# Patient Record
Sex: FEMALE | Race: WHITE | Employment: FULL TIME | URBAN - METROPOLITAN AREA
[De-identification: names, ages, dates, MRNs, and addresses within clinical notes are randomized per-mention and may not be internally consistent; named-entity substitution may affect disease eponyms.]

---

## 2017-10-09 PROBLEM — F33.0 MILD EPISODE OF RECURRENT MAJOR DEPRESSIVE DISORDER (HCC): Status: ACTIVE | Noted: 2017-10-09

## 2017-10-09 PROBLEM — F33.0 MILD EPISODE OF RECURRENT MAJOR DEPRESSIVE DISORDER: Status: ACTIVE | Noted: 2017-10-09

## 2017-10-20 ENCOUNTER — HOSPITAL ENCOUNTER (OUTPATIENT)
Dept: ULTRASOUND IMAGING | Age: 42
Discharge: HOME | End: 2017-10-20
Attending: PHYSICIAN ASSISTANT
Payer: COMMERCIAL

## 2017-10-20 DIAGNOSIS — E03.9 ACQUIRED HYPOTHYROIDISM: ICD-10-CM

## 2017-10-20 DIAGNOSIS — F33.0 MILD EPISODE OF RECURRENT MAJOR DEPRESSIVE DISORDER (HCC): ICD-10-CM

## 2017-10-20 DIAGNOSIS — F41.9 ANXIETY: ICD-10-CM

## 2017-10-20 PROCEDURE — 76536 US EXAM OF HEAD AND NECK: CPT | Performed by: PHYSICIAN ASSISTANT

## 2017-11-20 PROBLEM — E55.9 VITAMIN D DEFICIENCY: Status: ACTIVE | Noted: 2017-11-20

## 2019-04-30 PROCEDURE — 88175 CYTOPATH C/V AUTO FLUID REDO: CPT | Performed by: PHYSICIAN ASSISTANT

## 2019-04-30 PROCEDURE — 87147 CULTURE TYPE IMMUNOLOGIC: CPT | Performed by: PHYSICIAN ASSISTANT

## 2019-04-30 PROCEDURE — 87086 URINE CULTURE/COLONY COUNT: CPT | Performed by: PHYSICIAN ASSISTANT

## 2019-04-30 PROCEDURE — 87624 HPV HI-RISK TYP POOLED RSLT: CPT | Performed by: PHYSICIAN ASSISTANT

## 2020-11-03 PROBLEM — Z86.19 HISTORY OF HPV INFECTION: Status: ACTIVE | Noted: 2020-11-03

## 2020-12-20 ENCOUNTER — IMMUNIZATION (OUTPATIENT)
Dept: LAB | Facility: HOSPITAL | Age: 45
End: 2020-12-20
Attending: PREVENTIVE MEDICINE
Payer: COMMERCIAL

## 2020-12-20 DIAGNOSIS — Z23 NEED FOR VACCINATION: ICD-10-CM

## 2020-12-20 PROCEDURE — 0001A PFIZER-BIONTECH COVID-19 VACCINE: CPT

## 2021-01-10 ENCOUNTER — IMMUNIZATION (OUTPATIENT)
Dept: LAB | Facility: HOSPITAL | Age: 46
End: 2021-01-10
Attending: PREVENTIVE MEDICINE

## 2021-01-10 DIAGNOSIS — Z23 NEED FOR VACCINATION: ICD-10-CM

## 2021-01-10 PROCEDURE — 0002A SARSCOV2 VAC 30MCG/0.3ML IM: CPT

## 2023-04-04 PROBLEM — D68.51 FACTOR V LEIDEN (HCC): Status: ACTIVE | Noted: 2023-04-04

## 2023-05-07 ENCOUNTER — HOSPITAL ENCOUNTER (OUTPATIENT)
Age: 48
Discharge: HOME | End: 2023-05-07
Payer: COMMERCIAL

## 2023-05-07 VITALS
HEIGHT: 65 IN | RESPIRATION RATE: 18 BRPM | SYSTOLIC BLOOD PRESSURE: 127 MMHG | HEART RATE: 61 BPM | DIASTOLIC BLOOD PRESSURE: 78 MMHG | BODY MASS INDEX: 19.83 KG/M2 | OXYGEN SATURATION: 100 % | TEMPERATURE: 98 F | WEIGHT: 119 LBS

## 2023-05-07 DIAGNOSIS — R21 SKIN RASH: Primary | ICD-10-CM

## 2023-05-07 DIAGNOSIS — B97.89 SORE THROAT (VIRAL): ICD-10-CM

## 2023-05-07 DIAGNOSIS — J02.8 SORE THROAT (VIRAL): ICD-10-CM

## 2023-05-07 LAB — S PYO AG THROAT QL IA.RAPID: NEGATIVE

## 2023-05-07 PROCEDURE — 99215 OFFICE O/P EST HI 40 MIN: CPT

## 2023-05-07 PROCEDURE — S0028 INJECTION, FAMOTIDINE, 20 MG: HCPCS

## 2023-05-07 PROCEDURE — 99214 OFFICE O/P EST MOD 30 MIN: CPT

## 2023-05-07 PROCEDURE — 96372 THER/PROPH/DIAG INJ SC/IM: CPT

## 2023-05-07 PROCEDURE — 96375 TX/PRO/DX INJ NEW DRUG ADDON: CPT

## 2023-05-07 PROCEDURE — 96374 THER/PROPH/DIAG INJ IV PUSH: CPT

## 2023-05-07 PROCEDURE — 87651 STREP A DNA AMP PROBE: CPT | Performed by: PHYSICIAN ASSISTANT

## 2023-05-07 RX ORDER — METHYLPREDNISOLONE SODIUM SUCCINATE 125 MG/2ML
125 INJECTION, POWDER, LYOPHILIZED, FOR SOLUTION INTRAMUSCULAR; INTRAVENOUS ONCE
Status: COMPLETED | OUTPATIENT
Start: 2023-05-07 | End: 2023-05-07

## 2023-05-07 RX ORDER — FAMOTIDINE 10 MG/ML
20 INJECTION, SOLUTION INTRAVENOUS ONCE
Status: COMPLETED | OUTPATIENT
Start: 2023-05-07 | End: 2023-05-07

## 2023-05-07 RX ORDER — PREDNISONE 20 MG/1
40 TABLET ORAL DAILY
Qty: 10 TABLET | Refills: 0 | Status: SHIPPED | OUTPATIENT
Start: 2023-05-07 | End: 2023-05-12

## 2023-05-07 RX ORDER — DIPHENHYDRAMINE HYDROCHLORIDE 50 MG/ML
25 INJECTION INTRAMUSCULAR; INTRAVENOUS ONCE
Status: COMPLETED | OUTPATIENT
Start: 2023-05-07 | End: 2023-05-07

## 2023-05-07 NOTE — ED INITIAL ASSESSMENT (HPI)
Patient reports fever last week for 4 days, around 101 range. Reports painful/tender/swollen lymph nodes to neck. States she developed rash to right arm yesterday and not spreading to trunk and legs as well as face. States started klonopin on 4/18. Intermittent headaches. Not eating or sleeping well. Denies cough or congestion.

## 2023-05-07 NOTE — DISCHARGE INSTRUCTIONS
Please return to the Emergency department/clinic if symptoms worsen or you develop new symptoms. Follow up with your primary care physician in 2 days. Take any medications prescribed to you as instructed. While suffering from an acute allergic reaction and/or hives, you should take an antihistamine every 8 hours. If you wish to avoid the drowsiness associated with Benadryl, you may use a long-acting, nondrowsy antihistamine such as Claritin, Zyrtec or Allegra. In an acute allergic reaction, you may take these medications more than once every 24 hours as described on the label. Ideally, you would take an antihistamine in the morning with a 20mg dose of Pepcid, another dose of antihistamine 8 hours later and at nighttime take a dose of Benadryl with a second 20mg dose of Pepcid. This can all be done while also using steroids to control allergic reation/hives.

## 2024-02-14 ENCOUNTER — OFFICE VISIT (OUTPATIENT)
Dept: INTEGRATIVE MEDICINE | Facility: CLINIC | Age: 49
End: 2024-02-14
Payer: COMMERCIAL

## 2024-02-14 VITALS
SYSTOLIC BLOOD PRESSURE: 126 MMHG | DIASTOLIC BLOOD PRESSURE: 80 MMHG | BODY MASS INDEX: 19.83 KG/M2 | HEIGHT: 65 IN | WEIGHT: 119 LBS | OXYGEN SATURATION: 98 % | HEART RATE: 64 BPM

## 2024-02-14 DIAGNOSIS — E34.8 ENDOCRINE AXIS DYSFUNCTION: ICD-10-CM

## 2024-02-14 DIAGNOSIS — F41.9 ANXIETY DISORDER, UNSPECIFIED TYPE: ICD-10-CM

## 2024-02-14 DIAGNOSIS — E03.9 ACQUIRED HYPOTHYROIDISM: Primary | ICD-10-CM

## 2024-02-14 DIAGNOSIS — K90.49 FOOD INTOLERANCE: ICD-10-CM

## 2024-02-14 DIAGNOSIS — R14.0 ABDOMINAL BLOATING: ICD-10-CM

## 2024-02-14 DIAGNOSIS — E61.7 DEFICIENCY OF MULTIPLE NUTRIENT ELEMENTS: ICD-10-CM

## 2024-02-14 DIAGNOSIS — K58.2 IRRITABLE BOWEL SYNDROME WITH BOTH CONSTIPATION AND DIARRHEA: ICD-10-CM

## 2024-02-14 DIAGNOSIS — R53.83 OTHER FATIGUE: ICD-10-CM

## 2024-02-14 DIAGNOSIS — G47.09 OTHER INSOMNIA: ICD-10-CM

## 2024-02-14 DIAGNOSIS — R63.5 WEIGHT GAIN: ICD-10-CM

## 2024-02-14 DIAGNOSIS — E55.9 VITAMIN D DEFICIENCY: ICD-10-CM

## 2024-02-14 NOTE — PATIENT INSTRUCTIONS
Smart phone apps to begin a meditative practice:    Headspace - Limited content for free    Insight Meditation Timer- (Free)-Great all-around conchita to use for guided meditations of many different types and lengths or just to use as a tool to time and track your meditation practice. This is my absolute favorite!    Calm- (Free)    Walking Meditations-($1.99)- Get your walk AND meditation done together. A good way to start out for individuals who feel they \"just can't sit still\" to begin a meditative practice.    Abide (free limited content)- If you prefer a more spiritually/Latter-day-based meditation practice      2.  FLORES by [employer]atur[employer]      FLORES (gamma-aminobutyric acid), an amino acid and a key inhibitory neurotransmitter, may help support a calm mind as well as muscle relaxation. Neurotransmitters are chemical messengers that carry information between nerve cells or from nerve cells to other target cells. FLORES is synthesized in the body from glutamic acid where its function is to have an inhibitory effect on the firing of neurons.  Open capsule and expel 1/2 the contents onto tongue and let absorb at night. May increase to full capsule as needed.      ----------OR------------      Insomnitol by [employer] for Health -  Insomnitol™ is a blend of botanicals, nutrients, and neurotransmitter precursors designed to support quality, restful sleep. By providing nutritional support for calm brain activity, Insomnitol™ helps promote the body’s natural ability to fall asleep and stay asleep.* Key ingredients include botanicals that support nervous system function, PharmaGABA® (a proprietary form of gamma-aminobutyric acid [FLORES]), L-theanine, melatonin, 5-hydroxytryptophan (5-HTP), and pyridoxal-5-phosphate (the activated form of vitamin B6).    Dose: 2 capsules at bedtime.         Where to Purchase: [email or web address]  This is our [facility] online dispensary for vitamins and  supplements where you receive a 10% discount off of supplement prices! Select the \"Log In\" and then use your email address to complete creating your personal account. Please call [employer] at [phone], if you need direct help.     The products and items listed below (the “Products”)  and their claims have not been evaluated by the Food and Drug Administration. Dietary products are not intended to treat, prevent, mitigate or cure disease. Ultimately, you must draw your own conclusion as to the efficacy of the Product and immediately stop use of the Products if a negative reaction should arise.  You should always consult a licensed health care professional before starting any supplement, dietary, or exercise program, especially if you are pregnant or have any pre-existing injuries or medical conditions. The patient agrees that the [facility] and its affiliates and its [facility] (“[facility]”) are not liable for the patients use of the Products. [facility] makes no representations or warranties of any kind, expressed or implied, as to the Products, including, but not limited to its efficacy, benefits or outcomes.  Patient agrees to contact his/her healthcare professional and stop use of Products should any reactions arise    ---------------------------------------------------------------------------------------------    3. Keep a food and symptom journal for identifying foods that may be triggering your GI symptoms.

## 2024-02-14 NOTE — PROGRESS NOTES
Janee Mcknight is a 48 year old female.  Chief Complaint   Patient presents with    [facility]     Hormone, santo,agitated, fatigued       HPI:   48 new pt presents today for initial evaluation.     Has been experiencing anxiety (towards end of day - hard to turn brain off), insomnia, brain fog, energy fades in afternoon..   On Wellbutrin and in therapy twice weekly.   H/o depression and anxiety, but worse in the last year after significant stressors. Able to do daily routines but not motivated to go out much.     Hypothyroid - Has been on Levothyroxine over 20yrs. PCP has been managing.       Menses mostly regular, perhaps shortening cycles, every 3-4weeks approx  - flow is heavy at times. More emotional lately.     Insomnia - Will wake up more frequently and with night terrors.  works night shift x20yrs    Weight loss resistance - Started berberine x2-3wks to help with blood sugar  IBS-C>D, bloating - will be after certain meals but unsure of triggers   BM consistency changes a lot, but not typically pellets    Factor V Leiden    REVIEW OF SYSTEMS:   Review of Systems   Constitutional:  Positive for fatigue. Negative for chills and fever.   Eyes: Negative.  Negative for visual disturbance.   Respiratory: Negative.  Negative for cough, shortness of breath and wheezing.    Cardiovascular: Negative.  Negative for chest pain.   Gastrointestinal:  Positive for abdominal distention, constipation and diarrhea. Negative for nausea and vomiting.   Endocrine: Positive for cold intolerance.   Genitourinary: Negative.    Musculoskeletal: Negative.    Skin: Negative.    Allergic/Immunologic: Negative.    Neurological: Negative.  Negative for weakness and headaches.   Hematological: Negative.    Psychiatric/Behavioral:  Positive for sleep disturbance. The patient is nervous/anxious.             FAMILY HISTORY:      Family History   Problem Relation Age of Onset    Hypertension Father     High Cholesterol Father     Breast  Cancer Mother 64    Heart Attack Paternal Grandfather     Suicide History Maternal Cousin Male        MEDICAL HISTORY:     Past Medical History:   Diagnosis Date    Allergic rhinitis     Anxiety state, unspecified 08/29/2012    Dehydration     Depressive disorder, not elsewhere classified 08/29/2012    Diarrhea     Dizziness and giddiness     Eating disorder, unspecified 08/29/2012    Factor 5 Leiden mutation, heterozygous (HCC)     Gastroenteritis     Headache(784.0)     Heartburn     Hematuria     w abdominal pain    Hydronephrosis     mild right and hydrooureter    Hypothyroidism     Hypothyroidism     Lipid screening 09/27/2007    Other unknown and unspecified cause of morbidity or mortality     Panic disorder     Paresthesia     Strep pharyngitis     acute    Substance abuse (HCC)     Suicide attempt ([facility])     overdose    Tooth infection     wisdom    Unspecified adjustment reaction     Urgency of urination     Vertigo     chronic    Viral syndrome     Vomiting        CURRENT MEDICATIONS:     Current Outpatient Medications   Medication Sig Dispense Refill    buPROPion 75 MG Oral Tab Take 1 tablet (75 mg total) by mouth daily. 90 tablet 0    MAGNESIUM OR Take by mouth.      Multiple Vitamin (MULTIVITAMIN ADULT OR) Take by mouth.      PREBIOTIC PRODUCT OR Take by mouth.      LEVOTHYROXINE 75 MCG Oral Tab TAKE 1 TABLET(75 MCG) BY MOUTH EVERY MORNING BEFORE BREAKFAST 90 tablet 0    vilazodone (VIIBRYD) 10 MG Oral Tab Take 1 tablet (10 mg total) by mouth every morning. (Patient not taking: Reported on 2/14/2024) 30 tablet 1    hydrOXYzine 25 MG Oral Tab Take 1 tablet (25 mg total) by mouth nightly as needed for Anxiety. (Patient not taking: Reported on 2/14/2024) 30 tablet 1    clonazePAM (KLONOPIN) 0.5 MG Oral Tab Take 1 tablet (0.5 mg total) by mouth 2 (two) times daily as needed for Anxiety. (Patient not taking: Reported on 2/14/2024) 60 tablet 1    Probiotic Product (PROBIOTIC OR) Take by mouth. (Patient not  taking: Reported on 2/14/2024)         SOCIAL HISTORY:   Lifestyle Factors affecting health:    Diet - Trying to Increase fiber to 25-30g and protein, avoids dairy but has cheese and yogurt. Avoiding gluten when she can.     Exercise - [place]-bke or pilates, or walks daily 5mi    Stress - Very high      -> IR sauna blanket, and working out   Sleep - hard to shut brain down to fall asleep, wakes through night    Pediatric nurse at [employer]  Social History     Socioeconomic History    Marital status:    Tobacco Use    Smoking status: Never    Smokeless tobacco: Never   Vaping Use    Vaping Use: Never used   Substance and Sexual Activity    Alcohol use: Yes     Comment: social, has not had since the end of March 2023    Drug use: No   Other Topics Concern    Caffeine Concern Yes    Exercise Yes    Seat Belt Yes       SURGICAL HISTORY:     Past Surgical History:   Procedure Laterality Date    Colposcopy,loop electrd cervix excis N/A 1/25/2016    Procedure: LOOP ELECTRICAL EXCISION CERVIX,COLPOSCOPY;  Surgeon: David Rodriguez MD;  Location: [facility]    Hysteroscopy,with sampling N/A 10/21/2015    Procedure: HYSTEROSCOPY W/DILATION AND CURETTAGE;  Surgeon: David Rodriguez MD;  Location: [facility]       PHYSICAL EXAM:     Vitals:    02/14/24 1331   BP: 126/80   BP Location: Right arm   Patient Position: Sitting   Cuff Size: adult   Pulse: 64   SpO2: 98%   Weight: 119 lb (54 kg)   Height: 5' 5\" (1.651 m)       Physical Exam  Vitals reviewed.   Constitutional:       General: She is not in acute distress.     Appearance: Normal appearance.   HENT:      Mouth/Throat:      Mouth: Mucous membranes are moist.      Pharynx: Oropharynx is clear.   Eyes:      Extraocular Movements: Extraocular movements intact.      Conjunctiva/sclera: Conjunctivae normal.   Neck:      Thyroid: No thyromegaly.   Cardiovascular:      Rate and Rhythm: Normal rate and regular rhythm.      Heart sounds: Normal heart  sounds.   Pulmonary:      Effort: Pulmonary effort is normal.      Breath sounds: Normal breath sounds.   Abdominal:      Palpations: Abdomen is soft.   Musculoskeletal:         General: No swelling or deformity.   Skin:     General: Skin is warm and dry.   Neurological:      General: No focal deficit present.      Mental Status: She is alert and oriented to person, place, and time.   Psychiatric:         Mood and Affect: Mood normal.         Behavior: Behavior normal.         Thought Content: Thought content normal.         Judgment: Judgment normal.          ASSESSMENT AND PLAN:     No visits with results within 6 Month(s) from this visit.   Latest known visit with results is:   Admission on 05/07/2023, Discharged on 05/07/2023   Component Date Value Ref Range Status    Strep A by PCR 05/07/2023 Negative  Negative Final      No results found.    1. Other fatigue    2. Other insomnia    3. Anxiety disorder, unspecified type  - Acupuncture Therapy [facility] ([place]) - Internal Referral    4. Vitamin D deficiency  - Vitamin D; Future    5. Deficiency of multiple nutrient elements  - Vitamin B12    6. Abdominal bloating  - HLA DQ2/DQ8 (Celiac Disease Genotyping)  - CELIAC DISEASE SCREEN Reflex [E]; Future    7. Food intolerance  - HLA DQ2/DQ8 (Celiac Disease Genotyping)  - CELIAC DISEASE SCREEN Reflex [E]; Future    8. Acquired hypothyroidism  - Assay, Thyroid Stim Hormone  - Free T3 (Triiodothryronine)  - Free T4, (Free Thyroxine)  - Reverse T3, Serum  - Thyroid peroxidase & thyroglobulin ab    9. Weight gain  - Hemoglobin A1C  - Insulin    10. Endocrine axis dysfunction  - Pregnenolone by MS/MS, Serum; Future  - Dehydroepiandrosterone Sulfate  - Estradiol  - Progesterone  - Testosterone,Total and Weakly Bound w/ SHBG  - Assay, Thyroid Stim Hormone  - Free T3 (Triiodothryronine)  - Free T4, (Free Thyroxine)  - Reverse T3, Serum  - Thyroid peroxidase & thyroglobulin ab  - Hemoglobin A1C  - Insulin  - HLA  DQ2/DQ8 (Celiac Disease Genotyping)  - Comp Metabolic Panel (14)  - Folic Acid Serum (Folate)  - Acupuncture Therapy [facility] ([place]) - Internal Referral    11. Irritable bowel syndrome with both constipation and diarrhea  - Acupuncture Therapy [facility] ([place]) - Internal Referral      Time spent with patient: Over 50 minutes spent in chart review and in direct communication with patient obtaining and reviewing history, creating a unique care plan, explaining the rationale for treatment, reviewing potential SE and overall treatment plan,  documenting all clinical information in Epic. Over 50% of this time was in education, counseling and coordination of care.     Problem List Items Addressed This Visit          Endocrine and Metabolic    Acquired hypothyroidism - Primary    Relevant Orders    Assay, Thyroid Stim Hormone    Free T3 (Triiodothryronine)    Free T4, (Free Thyroxine)    Reverse T3, Serum    Thyroid peroxidase & thyroglobulin ab    Vitamin D deficiency    Relevant Orders    Vitamin D       Mental Health    Anxiety disorder    Relevant Orders    Acupuncture Therapy [facility] ([place]) - Internal Referral     Other Visit Diagnoses       Other fatigue        Other insomnia        Deficiency of multiple nutrient elements        Relevant Orders    Vitamin B12    Abdominal bloating        Relevant Orders    HLA DQ2/DQ8 (Celiac Disease Genotyping)    CELIAC DISEASE SCREEN Reflex [E]    Food intolerance        Relevant Orders    HLA DQ2/DQ8 (Celiac Disease Genotyping)    CELIAC DISEASE SCREEN Reflex [E]    Weight gain        Relevant Orders    Hemoglobin A1C    Insulin    Endocrine axis dysfunction        Relevant Orders    Pregnenolone by MS/MS, Serum    Dehydroepiandrosterone Sulfate    Estradiol    Progesterone    Testosterone,Total and Weakly Bound w/ SHBG    Assay, Thyroid Stim Hormone    Free T3 (Triiodothryronine)    Free T4, (Free Thyroxine)    Reverse T3, Serum     Thyroid peroxidase & thyroglobulin ab    Hemoglobin A1C    Insulin    HLA DQ2/DQ8 (Celiac Disease Genotyping)    Comp Metabolic Panel (14)    Folic Acid Serum (Folate)    Acupuncture Therapy [facility] ([place]) - Internal Referral    Irritable bowel syndrome with both constipation and diarrhea        Relevant Orders    Acupuncture Therapy [facility] ([place]) - Internal Referral           Endocrine:  Hypothyroidism -PCP has been managing and last labs at [facility] in April. She is experiencing some underactive symptoms, but complicated by significant stressors in last year that could be effecting primarily the adrenal function and sex hormones as well.   -> check hormone, cortisol, and thyroid hormone levels    GI:  IBS - C>D with abdominal bloating. Discussed keeping a food and symptom journal as well as trial of removing gluten and dairy.   -> Handout for fiber sources  Consider KB[facility] FIT testing if not improved with the above.    HPA Axis: Increased stress, anxiety and depression. Working with a therapist and on meds. Rec meditation, Acupuncture, deep breathing in the AM, journaling.  -> Acupuncture Rx     Insomnia - Rec trial of FLORES at bedtime. Complicated by shift work.     Given further recommendations as below    Orders Placed This Visit:  Orders Placed This Encounter   Procedures    Pregnenolone by MS/MS, Serum    Dehydroepiandrosterone Sulfate    Estradiol    Progesterone    Testosterone,Total and Weakly Bound w/ SHBG    Assay, Thyroid Stim Hormone    Free T3 (Triiodothryronine)    Free T4, (Free Thyroxine)    Reverse T3, Serum    Thyroid peroxidase & thyroglobulin ab    Vitamin D    Vitamin B12    Hemoglobin A1C    Insulin    HLA DQ2/DQ8 (Celiac Disease Genotyping)    Comp Metabolic Panel (14)    Folic Acid Serum (Folate)    CELIAC DISEASE SCREEN Reflex [E]     Orders Placed This Encounter   Procedures    Acupuncture Therapy [facility] ([place]) - Internal Referral        Patient Instructions   Smart phone apps to begin a meditative practice:    Headspace - Limited content for free    Insight Meditation Timer- (Free)-Great all-around conchita to use for guided meditations of many different types and lengths or just to use as a tool to time and track your meditation practice. This is my absolute favorite!    Calm- (Free)    Walking Meditations-($1.99)- Get your walk AND meditation done together. A good way to start out for individuals who feel they \"just can't sit still\" to begin a meditative practice.    Abide (free limited content)- If you prefer a more spiritually/Yarsani-based meditation practice      2.  FLORES by [employer]      FLORES (gamma-aminobutyric acid), an amino acid and a key inhibitory neurotransmitter, may help support a calm mind as well as muscle relaxation. Neurotransmitters are chemical messengers that carry information between nerve cells or from nerve cells to other target cells. FLORES is synthesized in the body from glutamic acid where its function is to have an inhibitory effect on the firing of neurons.  Open capsule and expel 1/2 the contents onto tongue and let absorb at night. May increase to full capsule as needed.      ----------OR------------      Insomnitol by [employer] for [employer] -  Insomnitol™ is a blend of botanicals, nutrients, and neurotransmitter precursors designed to support quality, restful sleep. By providing nutritional support for calm brain activity, Insomnitol™ helps promote the body’s natural ability to fall asleep and stay asleep.* Key ingredients include botanicals that support nervous system function, PharmaGABA® (a proprietary form of gamma-aminobutyric acid [FLORES]), L-theanine, melatonin, 5-hydroxytryptophan (5-HTP), and pyridoxal-5-phosphate (the activated form of vitamin B6).    Dose: 2 capsules at bedtime.         Where to Purchase: [email or web address]  This is our [facility] online  dispensary for vitamins and supplements where you receive a 10% discount off of supplement prices! Select the \"Log In\" and then use your email address to complete creating your personal account. Please call [employer] at [phone], if you need direct help.     The products and items listed below (the “Products”)  and their claims have not been evaluated by the Food and Drug Administration. Dietary products are not intended to treat, prevent, mitigate or cure disease. Ultimately, you must draw your own conclusion as to the efficacy of the Product and immediately stop use of the Products if a negative reaction should arise.  You should always consult a licensed health care professional before starting any supplement, dietary, or exercise program, especially if you are pregnant or have any pre-existing injuries or medical conditions. The patient agrees that the [facility] and its affiliates and its [facility] (“[facility]”) are not liable for the patients use of the Products. [facility] makes no representations or warranties of any kind, expressed or implied, as to the Products, including, but not limited to its efficacy, benefits or outcomes.  Patient agrees to contact his/her healthcare professional and stop use of Products should any reactions arise    ---------------------------------------------------------------------------------------------    3. Keep a food and symptom journal for identifying foods that may be triggering your GI symptoms.    Return in about 8 weeks (around 4/10/2024), or 60min appt, for anxiety, IBS, hormones, lab f/u.    Patient affirmed understanding of plan and all questions were answered.     Bernarda Rivera PA-C

## 2024-02-15 ENCOUNTER — PATIENT MESSAGE (OUTPATIENT)
Dept: INTEGRATIVE MEDICINE | Facility: CLINIC | Age: 49
End: 2024-02-15

## 2024-02-15 DIAGNOSIS — E03.9 ACQUIRED HYPOTHYROIDISM: ICD-10-CM

## 2024-02-26 RX ORDER — LEVOTHYROXINE SODIUM 0.07 MG/1
75 TABLET ORAL
Qty: 90 TABLET | Refills: 0 | Status: SHIPPED | OUTPATIENT
Start: 2024-02-26

## 2024-02-27 ENCOUNTER — LAB ENCOUNTER (OUTPATIENT)
Dept: LAB | Facility: HOSPITAL | Age: 49
End: 2024-02-27
Attending: PHYSICIAN ASSISTANT
Payer: COMMERCIAL

## 2024-02-27 DIAGNOSIS — K90.49 FOOD INTOLERANCE: ICD-10-CM

## 2024-02-27 DIAGNOSIS — E55.9 VITAMIN D DEFICIENCY: ICD-10-CM

## 2024-02-27 DIAGNOSIS — R14.0 ABDOMINAL BLOATING: ICD-10-CM

## 2024-02-27 DIAGNOSIS — E34.8 ENDOCRINE AXIS DYSFUNCTION: ICD-10-CM

## 2024-02-27 LAB
ALBUMIN SERPL-MCNC: 3.9 G/DL (ref 3.4–5)
ALBUMIN/GLOB SERPL: 1.1 {RATIO} (ref 1–2)
ALP LIVER SERPL-CCNC: 57 U/L
ALT SERPL-CCNC: 15 U/L
ANION GAP SERPL CALC-SCNC: 2 MMOL/L (ref 0–18)
AST SERPL-CCNC: 23 U/L (ref 15–37)
BILIRUB SERPL-MCNC: 0.4 MG/DL (ref 0.1–2)
BUN BLD-MCNC: 18 MG/DL (ref 9–23)
CALCIUM BLD-MCNC: 9 MG/DL (ref 8.5–10.1)
CHLORIDE SERPL-SCNC: 109 MMOL/L (ref 98–112)
CO2 SERPL-SCNC: 26 MMOL/L (ref 21–32)
CREAT BLD-MCNC: 0.93 MG/DL
DHEA-S SERPL-MCNC: 91.7 UG/DL
EGFRCR SERPLBLD CKD-EPI 2021: 76 ML/MIN/1.73M2 (ref 60–?)
EST. AVERAGE GLUCOSE BLD GHB EST-MCNC: 117 MG/DL (ref 68–126)
ESTRADIOL SERPL-MCNC: 203.9 PG/ML
FASTING STATUS PATIENT QL REPORTED: YES
FOLATE SERPL-MCNC: 22.3 NG/ML (ref 8.7–?)
GLOBULIN PLAS-MCNC: 3.6 G/DL (ref 2.8–4.4)
GLUCOSE BLD-MCNC: 94 MG/DL (ref 70–99)
HBA1C MFR BLD: 5.7 % (ref ?–5.7)
IGA SERPL-MCNC: 262 MG/DL (ref 70–312)
INSULIN SERPL-ACNC: 4.2 MU/L (ref 3–25)
OSMOLALITY SERPL CALC.SUM OF ELEC: 286 MOSM/KG (ref 275–295)
POTASSIUM SERPL-SCNC: 3.5 MMOL/L (ref 3.5–5.1)
PROGEST SERPL-MCNC: 9.26 NG/ML
PROT SERPL-MCNC: 7.5 G/DL (ref 6.4–8.2)
SODIUM SERPL-SCNC: 137 MMOL/L (ref 136–145)
T3FREE SERPL-MCNC: 2.25 PG/ML (ref 2.4–4.2)
T4 FREE SERPL-MCNC: 1 NG/DL (ref 0.8–1.7)
THYROGLOB SERPL-MCNC: <15 U/ML (ref ?–60)
THYROPEROXIDASE AB SERPL-ACNC: 109 U/ML (ref ?–60)
TSI SER-ACNC: 3.47 MIU/ML (ref 0.36–3.74)
VIT B12 SERPL-MCNC: 483 PG/ML (ref 193–986)
VIT D+METAB SERPL-MCNC: 34.2 NG/ML (ref 30–100)

## 2024-02-27 PROCEDURE — 80053 COMPREHEN METABOLIC PANEL: CPT | Performed by: PHYSICIAN ASSISTANT

## 2024-02-27 PROCEDURE — 86800 THYROGLOBULIN ANTIBODY: CPT | Performed by: PHYSICIAN ASSISTANT

## 2024-02-27 PROCEDURE — 84443 ASSAY THYROID STIM HORMONE: CPT | Performed by: PHYSICIAN ASSISTANT

## 2024-02-27 PROCEDURE — 82784 ASSAY IGA/IGD/IGG/IGM EACH: CPT

## 2024-02-27 PROCEDURE — 84481 FREE ASSAY (FT-3): CPT | Performed by: PHYSICIAN ASSISTANT

## 2024-02-27 PROCEDURE — 84439 ASSAY OF FREE THYROXINE: CPT | Performed by: PHYSICIAN ASSISTANT

## 2024-02-27 PROCEDURE — 82306 VITAMIN D 25 HYDROXY: CPT

## 2024-02-27 PROCEDURE — 86376 MICROSOMAL ANTIBODY EACH: CPT | Performed by: PHYSICIAN ASSISTANT

## 2024-02-27 PROCEDURE — 36415 COLL VENOUS BLD VENIPUNCTURE: CPT | Performed by: PHYSICIAN ASSISTANT

## 2024-02-27 PROCEDURE — 81377 HLA II TYPE 1 AG EQUIV LR: CPT | Performed by: PHYSICIAN ASSISTANT

## 2024-02-27 PROCEDURE — 82670 ASSAY OF TOTAL ESTRADIOL: CPT | Performed by: PHYSICIAN ASSISTANT

## 2024-02-27 PROCEDURE — 82746 ASSAY OF FOLIC ACID SERUM: CPT | Performed by: PHYSICIAN ASSISTANT

## 2024-02-27 PROCEDURE — 83525 ASSAY OF INSULIN: CPT | Performed by: PHYSICIAN ASSISTANT

## 2024-02-27 PROCEDURE — 84140 ASSAY OF PREGNENOLONE: CPT

## 2024-02-27 PROCEDURE — 86364 TISS TRNSGLTMNASE EA IG CLAS: CPT

## 2024-02-27 PROCEDURE — 84482 T3 REVERSE: CPT | Performed by: PHYSICIAN ASSISTANT

## 2024-02-27 PROCEDURE — 82627 DEHYDROEPIANDROSTERONE: CPT | Performed by: PHYSICIAN ASSISTANT

## 2024-02-27 PROCEDURE — 82607 VITAMIN B-12: CPT | Performed by: PHYSICIAN ASSISTANT

## 2024-02-27 PROCEDURE — 84144 ASSAY OF PROGESTERONE: CPT | Performed by: PHYSICIAN ASSISTANT

## 2024-02-27 PROCEDURE — 83036 HEMOGLOBIN GLYCOSYLATED A1C: CPT | Performed by: PHYSICIAN ASSISTANT

## 2024-02-27 PROCEDURE — 84410 TESTOSTERONE BIOAVAILABLE: CPT | Performed by: PHYSICIAN ASSISTANT

## 2024-02-29 LAB — TTG IGA SER-ACNC: 0.4 U/ML (ref ?–7)

## 2024-03-01 LAB — REVERSE T3: 12.5 NG/DL

## 2024-03-03 LAB — PREGNENOLONE: 159 NG/DL

## 2024-03-05 LAB
DQ2 (DQA1 0501/0505,DQB1 02XX): NEGATIVE
DQ8 (DQA1 03XX, DQB1 0302): NEGATIVE

## 2024-03-06 LAB
SEX HORM BIND GLOB: 176 NMOL/L
TESTOST % FREE+WEAK BND: 5.7 %
TESTOST FREE+WEAK BND: 1.7 NG/DL
TESTOSTERONE TOT /MS: 30 NG/DL

## 2024-04-02 ENCOUNTER — TELEPHONE (OUTPATIENT)
Dept: INTEGRATIVE MEDICINE | Facility: CLINIC | Age: 49
End: 2024-04-02

## 2024-04-03 ENCOUNTER — PATIENT MESSAGE (OUTPATIENT)
Dept: INTEGRATIVE MEDICINE | Facility: CLINIC | Age: 49
End: 2024-04-03

## 2024-04-03 ENCOUNTER — TELEMEDICINE (OUTPATIENT)
Dept: INTEGRATIVE MEDICINE | Facility: CLINIC | Age: 49
End: 2024-04-03
Payer: COMMERCIAL

## 2024-04-03 DIAGNOSIS — R14.0 ABDOMINAL BLOATING: ICD-10-CM

## 2024-04-03 DIAGNOSIS — G47.09 OTHER INSOMNIA: ICD-10-CM

## 2024-04-03 DIAGNOSIS — E34.8 ENDOCRINE AXIS DYSFUNCTION: ICD-10-CM

## 2024-04-03 DIAGNOSIS — K58.2 IRRITABLE BOWEL SYNDROME WITH BOTH CONSTIPATION AND DIARRHEA: ICD-10-CM

## 2024-04-03 DIAGNOSIS — E06.3 HYPOTHYROIDISM DUE TO HASHIMOTO'S THYROIDITIS: Primary | ICD-10-CM

## 2024-04-03 DIAGNOSIS — E55.9 VITAMIN D DEFICIENCY: ICD-10-CM

## 2024-04-03 DIAGNOSIS — E03.8 HYPOTHYROIDISM DUE TO HASHIMOTO'S THYROIDITIS: Primary | ICD-10-CM

## 2024-04-03 DIAGNOSIS — R53.83 OTHER FATIGUE: ICD-10-CM

## 2024-04-03 DIAGNOSIS — F41.9 ANXIETY DISORDER, UNSPECIFIED TYPE: ICD-10-CM

## 2024-04-03 RX ORDER — BUPROPION HYDROCHLORIDE 75 MG/1
75 TABLET ORAL 2 TIMES DAILY
COMMUNITY

## 2024-04-03 RX ORDER — THYROID 30 MG/1
30 TABLET ORAL DAILY
Qty: 30 TABLET | Refills: 2 | Status: SHIPPED | OUTPATIENT
Start: 2024-04-03

## 2024-04-03 RX ORDER — THYROID 15 MG/1
15 TABLET ORAL DAILY
Qty: 30 TABLET | Refills: 2 | Status: SHIPPED | OUTPATIENT
Start: 2024-04-03

## 2024-04-03 NOTE — PATIENT INSTRUCTIONS
Recommendations and summary:    Catherine Varela, The Hashimoto’s Protocol is a compilation of action steps, and a roadmap that will help you navigate your healing journey.   [email or web address]       ----------------------------------------------------------------------------------------------    2. Berberine 500 mg twice daily    3. Inositol (Powder) by [employer] for healthy blood sugar regulation and anxiety:    Inositol is a component of the B-complex family. Evans-inositol is the primary form of inositol found in the central nervous system. It plays an important role in cell membrane formation and serves as part of the phosphatidylinositol secondary messenger system, supporting serotonin, norepinenephrine and cholinergic receptor function. As a result, inositol may support healthy mood, emotional wellness and behavior, and help lessen occasional nervous tension. Research also suggests that evans-inositol may help to support healthy ovulatory activity, ovarian function, and reproductive system function. Also, Evans-inositol inhibits duodenal glucose absorption and reduces blood glucoses rises.      Take 2 scoops in water, 1-2 times daily, with or between meals.  2 scoops = 4g     4.  Switch from levothyroxine to NP thyroid.   Let us know after 2 weeks if you are not seeing an improvement in symptoms and we can increase from there.  -> Get labs drawn in 4 weeks after starting medication.  Hold the thyroid medication the morning of the blood draw and take the medication after.    Absorption of thyroid hormone medication is affected by:  -Soy  -Coffee  -Possibly grapefruit juice  -Calcium Carbonate (in bone supplements and antacids such as Pepcid, TUMS) -Aluminum Antacids (i.e. Mylanta, Gaviscon)  -Ferrous Sulfate  Therefore, please take medication away from the above foods/meds.    *Exercise increases endorphins, improves mood, reduces anxiety, and improves sleep - all of which also improves  thyroid function and tissue absorption of hormones.     5. [employer] FIT testing for identifying food sensitivities.   2 options will be placed in [facility] and then let me know which you would prefer.     The  and 176 Tests are patented, multi-pathway delayed food sensitivity tests. The test uses patented technology that measures both IgG and Immune Complexes, the most common food-related pathways in the body. This enables the FIT test to be able to identify food sensitivities, inflammation and leaky gut from a single test.    Food Sensitivities affect more than 100 million people worldwide. They are very difficult to identify because the symptoms can be delayed up to 72 hours after eating.    Go Here to see the foods that are tested with each one: [email or web address]    Expect an e-mail from [employer] with next steps on how to complete the test. Should you have any questions regarding the test and/or how to complete it, please reach out to [employer] through their online portal.     [email or web address]    To review the results of your specialty test make sure to schedule an appointment Bernarda [facility].       ------------------------------------------------------------------------------------    Where to Purchase above supplements: [email or web address]  This is our [facility] online dispensary for vitamins and supplements where you receive a 10% discount off of supplement prices! Select the \"Log In\" and then use your email address to complete creating your personal account. Please call [employer] at [phone], if you need direct help.     The products and items listed below (the “Products”)  and their claims have not been evaluated by the Food and Drug Administration. Dietary products are not intended to treat, prevent, mitigate or cure disease. Ultimately, you must draw your own conclusion as to the efficacy  of the Product and immediately stop use of the Products if a negative reaction should arise.  You should always consult a licensed health care professional before starting any supplement, dietary, or exercise program, especially if you are pregnant or have any pre-existing injuries or medical conditions. The patient agrees that the [facility] and its affiliates and its [facility] (“[facility]”) are not liable for the patients use of the Products. [facility] makes no representations or warranties of any kind, expressed or implied, as to the Products, including, but not limited to its efficacy, benefits or outcomes.  Patient agrees to contact his/her healthcare professional and stop use of Products should any reactions arise

## 2024-04-03 NOTE — PROGRESS NOTES
Janee Mcknight is a 49 year old female.  Chief Complaint   Patient presents with    Follow - Up     Lab results       HPI:   48 new pt presents today for initial evaluation.     Started the Vit D and FLORES at night x3-4weeks.  Started a wellness journal.     Anxiety (towards end of day - hard to turn brain off), insomnia, brain fog, energy fades in afternoon.   On Wellbutrin and in therapy twice weekly.   H/o depression and anxiety, but worse in the last year after significant stressors. Able to do daily routines but not motivated to go out much.     Hypothyroid - Has been on Levothyroxine over 20yrs. PCP has been managing.       Menses mostly regular, perhaps shortening cycles, every 3-4weeks approx  - flow is heavy at times and 1 day of significant cramping. More emotional lately.   Has \"Better hormones\" drink daily that may have chasteberry, ashwagandha, and inositol (250mg) x1yr    Insomnia - Will wake up more frequently and with night terrors.  works night shift x20yrs.    Weight loss resistance - Started berberine x2-3wks to help with blood sugar but has not been taking regularly.    IBS-C>D, bloating - will be after certain meals but unsure of triggers   BM consistency changes a lot, but not typically pellets    H/o Factor V Leiden    REVIEW OF SYSTEMS:   Review of Systems   Constitutional:  Positive for fatigue. Negative for chills and fever.   Eyes: Negative.  Negative for visual disturbance.   Respiratory: Negative.  Negative for cough, shortness of breath and wheezing.    Cardiovascular: Negative.  Negative for chest pain.   Gastrointestinal:  Positive for abdominal distention, constipation and diarrhea. Negative for nausea and vomiting.   Endocrine: Positive for cold intolerance.   Genitourinary: Negative.    Musculoskeletal: Negative.    Skin: Negative.    Allergic/Immunologic: Negative.    Neurological: Negative.  Negative for weakness and headaches.   Hematological: Negative.    Psychiatric/Behavioral:   Positive for sleep disturbance. The patient is nervous/anxious.             FAMILY HISTORY:      Family History   Problem Relation Age of Onset    Hypertension Father     High Cholesterol Father     Breast Cancer Mother 64    Heart Attack Paternal Grandfather     Suicide History Maternal Cousin Male        MEDICAL HISTORY:     Past Medical History:   Diagnosis Date    Allergic rhinitis     Anxiety state, unspecified 08/29/2012    Dehydration     Depressive disorder, not elsewhere classified 08/29/2012    Diarrhea     Dizziness and giddiness     Eating disorder, unspecified 08/29/2012    Factor 5 Leiden mutation, heterozygous (HCC)     Gastroenteritis     Headache(784.0)     Heartburn     Hematuria     w abdominal pain    Hydronephrosis     mild right and hydrooureter    Hypothyroidism     Hypothyroidism     Lipid screening 09/27/2007    Other unknown and unspecified cause of morbidity or mortality     Panic disorder     Paresthesia     Strep pharyngitis     acute    Substance abuse (HCC)     Suicide attempt (HCC)     overdose    Tooth infection     wisdom    Unspecified adjustment reaction     Urgency of urination     Vertigo     chronic    Viral syndrome     Vomiting        CURRENT MEDICATIONS:     Current Outpatient Medications   Medication Sig Dispense Refill    buPROPion 75 MG Oral Tab Take 1 tablet (75 mg total) by mouth 2 (two) times daily.      thyroid (NP THYROID) 30 MG Oral Tab Take 1 tablet (30 mg total) by mouth daily. Take with 15mg tablet. Take at least 30-60min away from other food or supplements. 30 tablet 2    thyroid (NP THYROID) 15 MG Oral Tab Take 1 tablet (15 mg total) by mouth daily. Take with 30mg tablet. 30 tablet 2    MAGNESIUM OR Take by mouth.      Multiple Vitamin (MULTIVITAMIN ADULT OR) Take by mouth.      PREBIOTIC PRODUCT OR Take by mouth.      vilazodone (VIIBRYD) 10 MG Oral Tab Take 1 tablet (10 mg total) by mouth every morning. (Patient not taking: Reported on 2/14/2024) 30 tablet 1     hydrOXYzine 25 MG Oral Tab Take 1 tablet (25 mg total) by mouth nightly as needed for Anxiety. (Patient not taking: Reported on 2/14/2024) 30 tablet 1    clonazePAM (KLONOPIN) 0.5 MG Oral Tab Take 1 tablet (0.5 mg total) by mouth 2 (two) times daily as needed for Anxiety. (Patient not taking: Reported on 2/14/2024) 60 tablet 1    Probiotic Product (PROBIOTIC OR) Take by mouth. (Patient not taking: Reported on 2/14/2024)         SOCIAL HISTORY:   Lifestyle Factors affecting health:    Diet - Trying to Increase fiber to 25-30g and protein, avoids dairy but has cheese and yogurt.   Avoiding gluten when she can.     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[employer]  Son, 20yo   Social History     Socioeconomic History    Marital status:    Tobacco Use    Smoking status: Never    Smokeless tobacco: Never   Vaping Use    Vaping Use: Never used   Substance and Sexual Activity    Alcohol use: Yes     Comment: social, has not had since the end of March 2023    Drug use: No   Other Topics Concern    Caffeine Concern Yes    Exercise Yes    Seat Belt Yes       SURGICAL HISTORY:     Past Surgical History:   Procedure Laterality Date    Colposcopy,loop electrd cervix excis N/A 1/25/2016    Procedure: LOOP ELECTRICAL EXCISION CERVIX,COLPOSCOPY;  Surgeon: David Rodriguez MD;  Location: [facility]    Hysteroscopy,with sampling N/A 10/21/2015    Procedure: HYSTEROSCOPY W/DILATION AND CURETTAGE;  Surgeon: David Rodriguez MD;  Location: [facility]       PHYSICAL EXAM:     There were no vitals filed for this visit.      Physical Exam  Constitutional:       General: She is not in acute distress.     Appearance: Normal appearance. She is not ill-appearing or toxic-appearing.   HENT:      Head: Normocephalic and atraumatic.   Eyes:       Extraocular Movements: Extraocular movements intact.   Pulmonary:      Effort: Pulmonary effort is normal. No respiratory distress.   Musculoskeletal:      Cervical back: Normal range of motion.   Skin:     Coloration: Skin is not jaundiced.      Findings: No lesion.   Neurological:      Mental Status: She is alert and oriented to person, place, and time.   Psychiatric:         Mood and Affect: Mood normal.         Behavior: Behavior normal.         Thought Content: Thought content normal.         Judgment: Judgment normal.          ASSESSMENT AND PLAN:     [facility] Lab Encounter on 02/27/2024   Component Date Value Ref Range Status    Pregnenolone 02/27/2024 159 (H)  ng/dL Final    This test was developed and its performance characteristics  determined by [employer]. It has not been cleared or approved  by the Food and Drug Administration.  Reference Range:  Adults: <151    Immunoglobulin A 02/27/2024 262.00  70.00 - 312.00 mg/dL Final    Vitamin D, 25OH, Total 02/27/2024 34.2  30.0 - 100.0 ng/mL Final    Literature Recommendations for 25(OH)D levels are:  Range           Vitamin D Status   <20    ng/mL      Deficiency   20-<30 ng/mL      Insufficiency    ng/mL      Sufficiency   >100   ng/mL      Toxicity    *Clinical controversy exists regarding optimal 25(OH)D levels. Emerging evidence links potential adverse effects to high levels, particularly >60 ng/mL.        Tissue Transglutaminase IgA Ab 02/27/2024 0.4  <7.0 U/mL Final   Office Visit on 02/14/2024   Component Date Value Ref Range Status    DHEA Sulfate 02/27/2024 91.7  25.9 - 460.2 ug/dL Final    This test may exhibit interference of greater than 10% when a sample is collected from a person who is consuming high dose of biotin (a.k.a., vitamin B7, vitamin H, coenzyme R) supplements resulting in serum concentrations &gt;12.5 ng/mL, leading to either falsely elevated or falsely depressed results.  Intake of the recommended daily allowance (RDA) for biotin  (0.03 mg) has not been shown to typically cause significant interference; however, high dose daily dietary supplements may contain biotin concentrations greater than 150 times (5-10 mg) the RDA.  It is recommended that physicians ask all patients who may be on biotin supplementation to stop biotin consumption at least 72 hours prior to collection of a new sample.    Estradiol 02/27/2024 203.9  No established range for female sex pg/mL Final    Follicular                             21.4-164.8  pg/mL  Midcycle                               49.9-367.2  pg/mL  Luteal                                 40.2-259.0  pg/mL  Postmenopausal on Hormone Therapy      <462.1      pg/mL  Postmenopausal not on Hormone Therapy  <58.3       pg/mL  This test may exhibit interference when a sample is collected from a person who is consuming high dose of biotin (a.k.a., vitamin B7, vitamin H, coenzyme R) supplements resulting in serum concentrations >100 ng/mL.  Intake of the recommended daily allowance (RDA) for biotin (0.03 mg) has not been shown to typically cause significant interference; however, high dose daily dietary supplements may contain biotin concentrations greater than 150 times (5-10 mg) the RDA.  It is recommended that physicians ask all patients who may be on biotin supplementation to stop biotin consumption at least 72 hours prior to collection of a new sample.      Progesterone 02/27/2024 9.26  No established range for female sex ng/mL Final    Normal Females   Follicular       0.210 - 1.70   ng/mL   Luteal           2.25 - 24.2    ng/mL   Mid-Luteal       8.76 - 21.6    ng/mL   Post Menopausal  <0.200 - 0.901 ng/mL    Pregnant Females   First Trimester  11.4 - 41.0   ng/mL   Second Trimester 13.8 - 156.0  ng/mL   Third Trimester  51.4 - >200.0 ng/mL  This test may exhibit interference when a sample is collected from a person who is consuming high dose of biotin (a.k.a., vitamin B7, vitamin H, coenzyme R) supplements  resulting in serum concentrations >100 ng/mL.  Intake of the recommended daily allowance (RDA) for biotin (0.03 mg) has not been shown to typically cause significant interference; however, high dose daily dietary supplements may contain biotin concentrations greater than 150 times (5-10 mg) the RDA.  It is recommended that physicians ask all patients who may be on biotin supplementation to stop biotin consumption at least 72 hours prior to collection of a new sample.      Testosterone Tot LC/MS 02/27/2024 30.0  ng/dL Final                             Female:                            Premenopausal    10.0 - 55.0                            Postmenopausal    7.0 - 40.0    Testost % Free+Weak Bnd 02/27/2024 5.7  3.0 - 18.0 % Final    **Results verified by repeat testing**  This test was developed and its performance characteristics  determined by [employer]. It has not been cleared or approved  by the Food and Drug Administration.    Testost Free+Weak Bnd 02/27/2024 1.7  0.0 - 9.5 ng/dL Final    Sex Horm Bind Glob 02/27/2024 176.0 (H)  24.6 - 122.0 nmol/L Final    TSH 02/27/2024 3.470  0.358 - 3.740 mIU/mL Final    This test may exhibit interference when a sample is collected from a person who is consuming high dose of biotin (a.k.a., vitamin B7, vitamin H, coenzyme R) supplements resulting in serum concentrations >100 ng/mL.  Intake of the recommended daily allowance (RDA) for biotin (0.03 mg) has not been shown to typically cause significant interference; however, high dose daily dietary supplements may contain biotin concentrations greater than 150 times (5-10 mg) the RDA.  It is recommended that physicians ask all patients who may be on biotin supplementation to stop biotin consumption at least 72 hours prior to collection of a new sample.      T3 Free 02/27/2024 2.25 (L)  2.40 - 4.20 pg/mL Final    This test may exhibit interference when a sample is collected from a person who is consuming high dose of biotin  (a.k.a., vitamin B7, vitamin H, coenzyme R) supplements resulting in serum concentrations >100 ng/mL.  Intake of the recommended daily allowance (RDA) for biotin (0.03 mg) has not been shown to typically cause significant interference; however, high dose daily dietary supplements may contain biotin concentrations greater than 150 times (5-10 mg) the RDA.  It is recommended that physicians ask all patients who may be on biotin supplementation to stop biotin consumption at least 72 hours prior to collection of a new sample.      Free T4 02/27/2024 1.0  0.8 - 1.7 ng/dL Final    If applicable: Pregnancy Reference Intervals  First trimester 10-13 weeks gestation    0.9-1.4 ng/dL  Second trimester 14-26 weeks gestation   0.7-1.3 ng/dL      This test may exhibit interference when a sample is collected from a person who is consuming high dose of biotin (a.k.a., vitamin B7, vitamin H, coenzyme R) supplements resulting in serum concentrations >100 ng/mL.  Intake of the recommended daily allowance (RDA) for biotin (0.03 mg) has not been shown to typically cause significant interference; however, high dose daily dietary supplements may contain biotin concentrations greater than 150 times (5-10 mg) the RDA.  It is recommended that physicians ask all patients who may be on biotin supplementation to stop biotin consumption at least 72 hours prior to collection of a new sample.      Reverse T3 02/27/2024 12.5  9.2 - 24.1 ng/dL Final    Anti-Thyroglobulin 02/27/2024 <15  <60 U/mL Final    This test may exhibit interference when a sample is collected from a person who is consuming high dose of biotin (a.k.a., vitamin B7, vitamin H, coenzyme R) supplements resulting in serum concentrations >=100 ng/mL, leading to falsely depressed Thyroglobulin results (32% - 37% decrease).  Intake of the recommended daily allowance (RDA) for biotin (0.03 mg) has not been shown to typically cause significant interference; however, high dose daily  dietary supplements may contain biotin concentrations greater than 150 times (5-10 mg) the RDA.  It is recommended that physicians ask all patients who may be on biotin supplementation to stop biotin consumption at least 72 hours prior to collection of a new sample.      Anti-Thyroperoxidase 02/27/2024 109 (H)  <60 U/mL Final    Vitamin B12 02/27/2024 483  193 - 986 pg/mL Final    Vitamin B12 Reference Range      Deficient:      <150 pg/mL      Indeterminate   150-192 pg/mL      Normal:         193-986 pg/mL      This test may exhibit interference when a sample is collected from a person who is consuming high dose of biotin (a.k.a., vitamin B7, vitamin H, coenzyme R) supplements resulting in serum concentrations >100 ng/mL.  Intake of the recommended daily allowance (RDA) for biotin (0.03 mg) has not been shown to typically cause significant interference; however, high dose daily dietary supplements may contain biotin concentrations greater than 150 times (5-10 mg) the RDA.  It is recommended that physicians ask all patients who may be on biotin supplementation to stop biotin consumption at least 72 hours prior to collection of a new sample.      HgbA1C 02/27/2024 5.7 (H)  <5.7 % Final     Normal HbA1C:     <5.7%      Pre-Diabetic:     5.7 - 6.4%      Diabetic:         >6.4%      Diabetic Control: <7.0%        Estimated Average Glucose 02/27/2024 117  68 - 126 mg/dL Final    eAG is the estimated average glucose calculated from Hgb A1c according to the formula recommended by the American Diabetes Association. eAG levels reflect the long term average glucose and may not correlate with random or fasting glucose levels since these represent specific points in time.           Insulin 02/27/2024 4.2  3.0 - 25.0 mU/L Final    DQ2 (DQA1 0501/0505,DQB1 02XX) 02/27/2024 Negative   Final    DQ8 (DQA1 03XX, DQB1 0302) 02/27/2024 Negative   Final    Comment: Final Results:  DQB1*06:KATHY,06:EENTM  DQA1*01:KIERSTEN,01:EENTG  Code  Translation:  EENTF            01:02/01:05/01:08/01:09/01:11/01:16N/01:19 /01:20/01:21/01:23/01:25/01:28/01:31/01:32                   /01:38/01:39/01:40Q/01:41/01:42/01:46                   /01:48/01:51/01:52/01:54/01:62/01:63/01:69                   /01:71/01:72/01:73/01:75/01:85/01:90/01:91 /01:92/01:93/01:94/01:100  EENTG            01:03/01:10/01:14/01:15N/01:17/01:30/01:45                   /01:47/01:50/01:57/01:65/01:68/01:70/01:76                   /01:78/01:79/01:82/01:84/01:87/01:97  EENTJ            06:02/06:16/06:19/06:24/06:46/06:47/06:71                   /06:72/06:73/06:74/06:75N/06:76/06:77N                   /06:78/06:80/06:84/06:95/06:96/06:97                   /06:106/06:107/06:109/06:111/06:113/06:114                   /06:115/06:116/06:117/06:124/06:125/06:126                   /06:127/06:136/06:137/06:138/06:146/06:147                                              /06:150/06:152/06:159/06:161/06:163/06:166                   /06:173/06:175/06:178/06:179N/06:182                   /06:183/06:188/06:192/06:197/06:198/06:200                   /06:211/06:213/06:215/06:216N/06:219                   /06:224/06:225/06:226/06:227/06:228/06:232                   /06:235/06:236/06:237/06:240/06:242/06:249                   /06:255/06:256/06:262/06:264/06:271/06:273                   /06:284/06:286/06:289/06:290/06:293/06:294                   /06:295/06:296/06:297/06:298/06:300                   /06:304N/06:306N/06:308N/06:311/06:314                   /06:315/06:317N/06:324/06:326/06:333                   /06:335/06:338/06:341N/06:344/06:347                   /06:354/06:355/06:356/06:357/06:363/06:364                   /06:366/06:370/06:372/06:374/06:376/06:380                   /06:384/06:386/06:388/06:390/06:395                   /06:397N/06:401/06:402/06:404/06:405                   /06:406/06:408/06:409/06:411/06:412/06:413                                               /06:416Q/06:422N/06:430/06:431/06:436                   /06:437/06:438  EENTM            06:03/06:30/06:41/06:44/06:59/06:61/06:62                   /06:64/06:65/06:87/06:90/06:91/06:92                   /06:110/06:128/06:133/06:134/06:141                   /06:144N/06:148/06:170/06:184/06:185                   /06:187/06:190/06:191/06:196/06:199/06:203                   /06:210/06:218/06:221/06:222/06:223/06:234                   /06:238/06:248/06:250/06:253/06:259/06:272                   /06:279/06:316/06:327/06:328/06:329/06:331                   /06:334/06:336/06:345N/06:346/06:350                   /06:352/06:360/06:362/06:365/06:367/06:371                   /06:373/06:385/06:391/06:392/06:394N                   /06:396/06:403/06:410/06:423N/06:424                   /06:425/06:428/06:433/06:440  The patient is not positive for any of the HLA DQ risk  alleles.  Celiac Disease risk from the HLA DQA/DQB  genotype is approximately 1:2518 (<0.04%).  Allele interpretation for all loci                            based on IMGT/HLA  database version 3.49.0  HLA Lab CLIA ID Number [id number]  Greater than 95% of celiac patients are positive for either DQ2 or DQ8  (Nancy and Eric, (1993)  Gastroenterology 105:910-922). However  these antigens may also be present in patients who do not have Celiac  disease.    Comment Celiac HLA 02/27/2024 Comment   Final    This test was performed using Polymerase Chain Reaction (PCR) and  Sequence Specific Oligonucleotide Probes (SSOP) ([facility]) technique.  Sequence Based Typing (SBT) may be used as a supplemental method  when necessary.  If you have questions, please call HLA customer service at  [phone] or email at [email or web address].    Addit Info  Celiac HLA 02/27/2024 Comment   Final    References:  1. Prudencio SUMMERS and Martha ELIAS. Celiac Disease. N Eng J Med 2007;     357:8554-9741.  2. Alicia PEREZ, Ene B, Bonamico M et al. HLA-DQ and risk gradient     for celiac disease. Hum  Immunol 2009; 70:55-59.  3. Kiley MM, Daron TC, Moses FM et al. Stratifying risk     for celiac disease in a large at-risk [place] population     by using HLA alleles. Clin Gastroenterol Hepatol 2009; 7:966-971.  4. Nancy HURTADO and Reba BA. (2005). Celiac Disease Genetics: Current     Concepts and Practical Applications. Clin Gastroenterol and     Hepat 3:843-851.  5. Isreal CL, Abdi DO, Prudencio PHR, et al. Celiac Disease.     In: Tal RA, Anton TC, Florentin CR, Urszula K, editors. [employer]), [facility], [place], July 3, 2008:1-27.     http://www.ncbi.nlm.nih.gov/bookshelf/br.fcgi?book=genepart=celiac     PMID [id number] (PubMed)  6. Lakisha W. Emerging concepts in celiac disease. Curr Opin Pediatr     2004;16:552-559.    Glucose 02/27/2024 94  70 - 99 mg/dL Final    Sodium 02/27/2024 137  136 - 145 mmol/L Final    Potassium 02/27/2024 3.5  3.5 - 5.1 mmol/L Final    Chloride 02/27/2024 109  98 - 112 mmol/L Final    CO2 02/27/2024 26.0  21.0 - 32.0 mmol/L Final    Anion Gap 02/27/2024 2  0 - 18 mmol/L Final    BUN 02/27/2024 18  9 - 23 mg/dL Final    Creatinine 02/27/2024 0.93  0.55 - 1.02 mg/dL Final    Calcium, Total 02/27/2024 9.0  8.5 - 10.1 mg/dL Final    Calculated Osmolality 02/27/2024 286  275 - 295 mOsm/kg Final    eGFR-Cr 02/27/2024 76  >=60 mL/min/1.73m2 Final    AST 02/27/2024 23  15 - 37 U/L Final    ALT 02/27/2024 15  13 - 56 U/L Final    Alkaline Phosphatase 02/27/2024 57  39 - 100 U/L Final    Bilirubin, Total 02/27/2024 0.4  0.1 - 2.0 mg/dL Final    Total Protein 02/27/2024 7.5  6.4 - 8.2 g/dL Final    Albumin 02/27/2024 3.9  3.4 - 5.0 g/dL Final    Globulin  02/27/2024 3.6  2.8 - 4.4 g/dL Final    A/G Ratio 02/27/2024 1.1  1.0 - 2.0 Final    Patient Fasting for CMP? 02/27/2024 Yes   Final    Folate (Folic Acid) 02/27/2024 22.3  >=8.7 ng/mL Final    This test may exhibit interference when a sample is collected from a person who is consuming high dose of biotin  (a.k.a., vitamin B7, vitamin H, coenzyme R) supplements resulting in serum concentrations >100 ng/mL.  Intake of the recommended daily allowance (RDA) for biotin (0.03 mg) has not been shown to typically cause significant interference; however, high dose daily dietary supplements may contain biotin concentrations greater than 150 times (5-10 mg) the RDA.  It is recommended that physicians ask all patients who may be on biotin supplementation to stop biotin consumption at least 72 hours prior to collection of a new sample.        No results found.    1. Hypothyroidism due to Hashimoto's thyroiditis  - Assay, Thyroid Stim Hormone  - Free T4, (Free Thyroxine)  - Free T3 (Triiodothryronine)  - Reverse T3, Serum    2. Other fatigue    3. Other insomnia    4. Anxiety disorder, unspecified type    5. Vitamin D deficiency    6. Abdominal bloating    7. Endocrine axis dysfunction    8. Irritable bowel syndrome with both constipation and diarrhea    This visit was conducted using Telemedicine with live, interactive video and audio.   The patient understands the risks and benefits of Telemedicine and that a Telemedicine visit limits the ability to perform a thorough physical examination which may affect objective findings related to specific symptoms and conditions which can, in turn, affect treatment.     The patient was located in the [facility] at the time of the encounter.      Time spent with patient: Over 50 minutes spent in chart review and in direct communication with patient obtaining and reviewing history, creating a unique care plan, explaining the rationale for treatment, reviewing potential SE and overall treatment plan,  documenting all clinical information in Epic. Over 50% of this time was in education, counseling and coordination of care.     Problem List Items Addressed This Visit          Endocrine and Metabolic    Vitamin D deficiency    Relevant Medications    thyroid (NP THYROID) 30 MG Oral Tab     thyroid (NP THYROID) 15 MG Oral Tab       Mental Health    Anxiety disorder    Relevant Medications    buPROPion 75 MG Oral Tab     Other Visit Diagnoses       Hypothyroidism due to Hashimoto's thyroiditis    -  Primary    Relevant Medications    thyroid (NP THYROID) 30 MG Oral Tab    thyroid (NP THYROID) 15 MG Oral Tab    Other Relevant Orders    Assay, Thyroid Stim Hormone    Free T4, (Free Thyroxine)    Free T3 (Triiodothryronine)    Reverse T3, Serum    Other fatigue        Other insomnia        Relevant Medications    buPROPion 75 MG Oral Tab    Abdominal bloating        Endocrine axis dysfunction        Relevant Medications    thyroid (NP THYROID) 30 MG Oral Tab    thyroid (NP THYROID) 15 MG Oral Tab    Irritable bowel syndrome with both constipation and diarrhea                 Endocrine:  Hypothyroidism -She is experiencing underactive symptoms.  Recent lab testing revealed elevated TPO antibodies which lends to underlying Hashimoto's, as well as increased TSH and low free T3, low normal free T4.  -> Discussed options of increasing levothyroxine versus switching to a desiccated thyroid to better support T3.  Discussed side effects and risk of hyperthyroid with symptoms to look for.  She agrees to switch to NP thyroid at this time.  Advised her to check back in after 2 weeks to let us know if she may need a dose adjustment.  -> Recheck thyroid hormones in 4 weeks.    Hormone imbalance -low progesterone, high normal estradiol, high SHBG.   -> Continue current hormone supplement drink.  Discussed how symptoms may improve once thyroid is optimally managed.     GI:  IBS - C>D with abdominal bloating.  Janee just started a wellness journal.  -> Consider [employer] FIT testing to identify inflammatory triggers, especially in light of Hashimoto's diagnosis.    Cardiometabolic:  Prediabetes with recent hemoglobin A1c at 5.7.  -> Recommend starting berberine on a daily basis.  -> Inositol to help with blood sugar regulation  and anxiety    HPA Axis: Increased stress, anxiety and depression. Working with a therapist and on meds.     Insomnia -will start to journal if FLORES is helping.    Given further recommendations as below    Orders Placed This Visit:  Orders Placed This Encounter   Procedures    Assay, Thyroid Stim Hormone    Free T4, (Free Thyroxine)    Free T3 (Triiodothryronine)    Reverse T3, Serum     No orders of the defined types were placed in this encounter.      Patient Instructions   Recommendations and summary:    Catherine Varela, The Hashimoto’s Protocol is a compilation of action steps, and a roadmap that will help you navigate your healing journey.   [email or web address]       ----------------------------------------------------------------------------------------------    2. Berberine 500 mg twice daily    3. Inositol (Powder) by [employer] for healthy blood sugar regulation and anxiety:    Inositol is a component of the B-complex family. Harry-inositol is the primary form of inositol found in the central nervous system. It plays an important role in cell membrane formation and serves as part of the phosphatidylinositol secondary messenger system, supporting serotonin, norepinenephrine and cholinergic receptor function. As a result, inositol may support healthy mood, emotional wellness and behavior, and help lessen occasional nervous tension. Research also suggests that harry-inositol may help to support healthy ovulatory activity, ovarian function, and reproductive system function. Also, Harry-inositol inhibits duodenal glucose absorption and reduces blood glucoses rises.      Take 2 scoops in water, 1-2 times daily, with or between meals.  2 scoops = 4g     4.  Switch from levothyroxine to NP thyroid.   Let us know after 2 weeks if you are not seeing an improvement in symptoms and we can increase from there.  -> Get labs drawn in 4 weeks after starting medication.  Hold the thyroid medication the  morning of the blood draw and take the medication after.    Absorption of thyroid hormone medication is affected by:  -Soy  -Coffee  -Possibly grapefruit juice  -Calcium Carbonate (in bone supplements and antacids such as Pepcid, TUMS) -Aluminum Antacids (i.e. Mylanta, Gaviscon)  -Ferrous Sulfate  Therefore, please take medication away from the above foods/meds.    *Exercise increases endorphins, improves mood, reduces anxiety, and improves sleep - all of which also improves thyroid function and tissue absorption of hormones.     5. [employer] FIT testing for identifying food sensitivities.   2 options will be placed in [employer] and then let me know which you would prefer.     The  and 176 Tests are patented, multi-pathway delayed food sensitivity tests. The test uses patented technology that measures both IgG and Immune Complexes, the most common food-related pathways in the body. This enables the FIT test to be able to identify food sensitivities, inflammation and leaky gut from a single test.    Food Sensitivities affect more than 100 million people worldwide. They are very difficult to identify because the symptoms can be delayed up to 72 hours after eating.    Go Here to see the foods that are tested with each one: [email or web address]    Expect an e-mail from [employer] with next steps on how to complete the test. Should you have any questions regarding the test and/or how to complete it, please reach out to [employer] through their online portal.     [email or web address]    To review the results of your specialty test make sure to schedule an appointment Bernarda HA.       ------------------------------------------------------------------------------------    Where to Purchase above supplements: [email or web address]  This is our [facility] online dispensary for vitamins and supplements where you receive  a 10% discount off of supplement prices! Select the \"Log In\" and then use your email address to complete creating your personal account. Please call [employer] at [phone], if you need direct help.     The products and items listed below (the “Products”)  and their claims have not been evaluated by the Food and Drug Administration. Dietary products are not intended to treat, prevent, mitigate or cure disease. Ultimately, you must draw your own conclusion as to the efficacy of the Product and immediately stop use of the Products if a negative reaction should arise.  You should always consult a licensed health care professional before starting any supplement, dietary, or exercise program, especially if you are pregnant or have any pre-existing injuries or medical conditions. The patient agrees that the [facility] and its affiliates and its [facility] (“[facility]”) are not liable for the patients use of the Products. [facility] makes no representations or warranties of any kind, expressed or implied, as to the Products, including, but not limited to its efficacy, benefits or outcomes.  Patient agrees to contact his/her healthcare professional and stop use of Products should any reactions arise      No follow-ups on file.    Patient affirmed understanding of plan and all questions were answered.     Bernarda Rivera PA-C

## 2024-04-15 ENCOUNTER — PATIENT MESSAGE (OUTPATIENT)
Dept: INTEGRATIVE MEDICINE | Facility: CLINIC | Age: 49
End: 2024-04-15

## 2024-05-14 ENCOUNTER — TELEMEDICINE (OUTPATIENT)
Dept: INTEGRATIVE MEDICINE | Facility: CLINIC | Age: 49
End: 2024-05-14

## 2024-05-14 DIAGNOSIS — G47.09 OTHER INSOMNIA: ICD-10-CM

## 2024-05-14 DIAGNOSIS — E34.8 ENDOCRINE AXIS DYSFUNCTION: ICD-10-CM

## 2024-05-14 DIAGNOSIS — R14.0 ABDOMINAL BLOATING: ICD-10-CM

## 2024-05-14 DIAGNOSIS — E61.7 DEFICIENCY OF MULTIPLE NUTRIENT ELEMENTS: ICD-10-CM

## 2024-05-14 DIAGNOSIS — K90.49 FOOD INTOLERANCE: ICD-10-CM

## 2024-05-14 DIAGNOSIS — E03.8 HYPOTHYROIDISM DUE TO HASHIMOTO'S THYROIDITIS: Primary | ICD-10-CM

## 2024-05-14 DIAGNOSIS — E06.3 HYPOTHYROIDISM DUE TO HASHIMOTO'S THYROIDITIS: Primary | ICD-10-CM

## 2024-05-14 DIAGNOSIS — E53.8 LOW SERUM VITAMIN B12: ICD-10-CM

## 2024-05-14 NOTE — PATIENT INSTRUCTIONS
GlutaShield by [employer]       The purpose of the intestinal epithelium (lining) is to allow the digestion and absorption of dietary nutrients while keeping unwanted toxins, microbes and food particles from passing directly into the body. GlutaShield includes a high dose of L-glutamine (4 g), which serves as nutrition for the gut lining. It provides 400 mg of deglycyrrhized licorice root extract (DGL) and 75 mg of aloe vera extract, both of which protect and promote the health of the gut mucosa. N-acetyl glucosamine and zinc boost GI integrity. GlutaShield is available in delicious chocolate and vanilla flavors    Dose: 1 serving daily for 1 month       2. Similase GF CF with meals to help break down gluten  Similase GFCF by [employer]    Gluten and casein digestive enzymes, including DPP IV, to support the breakdown of gluten or casein.    Digestion of gluten and casein can be particularly difficult for some individuals. Similase GFCF contains enzymes to support comprehensive digestion, while also providing dipeptidyl peptidase IV (DPP IV) activity for the digestion of proline-containing dipeptides from gluten and casein. Similase GFCF supports a gluten-free, casein-free lifestyle and helps relieve occasional indigestion, gas and bloating.    Features Microbial Enzymes that are active in both acidic and alkaline environments    Take 2 capsules with each meal or as recommended by your healthcare professional.     3. DHEA 5mg by [employer]    P[employer] DHEA is micronized to help increase absorption in the body. DHEA supports healthy regulation of fat and mineral metabolism, as well as endocrine and reproductive function, and energy levels.  Supports emotional well-being and immune function    Take 1 capsule at breakfast. If tolerated, increase to 10mg after 1week.    4. Fill out multiple symptom questionnaire (MSQ) to keep track of your symptoms over time.    5. Remove all foods  for 6wks -3mos. Reintroduce foods as discussed. See handout.

## 2024-05-14 NOTE — PROGRESS NOTES
Janee Mcknight is a 49 year old female.  Chief Complaint   Patient presents with    Follow - Up       HPI:   50yo pt presents today via video to f/u on [employer] FIT test results and treatment plan.     Removed soy, corn, gluten, caffeine, and dairy since reading Hashimoto's protocol. Then started to remove all the higher reactive foods on the FIT test x3wks.     Fatigue and brain fog and weight loss resistance persists.   GI better.  Sleeping better.     Started the Vit D and FLORES at night x3-4weeks.  Started a wellness journal.     Anxiety (towards end of day - hard to turn brain off), insomnia, brain fog, energy fades in afternoon.   On Wellbutrin and in therapy twice weekly.   H/o depression and anxiety, but worse in the last year after significant stressors. Able to do daily routines but not motivated to go out much.     Hypothyroid - Switched from Levothyroxine (on for over 20yrs) to NP thyroid x 5 weeks ago. PCP had been managing.   ----------------------------------------------------------    Menses mostly regular, perhaps shortening cycles, every 3-4weeks approx  - flow is heavy at times and 1 day of significant cramping. More emotional lately.   Has \"Better hormones\" drink daily that may have chasteberry, ashwagandha, and inositol (250mg) x1yr    Insomnia - Will wake up more frequently and with night terrors.  works night shift x20yrs.    Weight loss resistance - Started berberine x2-3wks to help with blood sugar but has not been taking regularly.    IBS-C>D, bloating - stools have been more regular.   BM consistency changes a lot, but not typically pellets    H/o Factor V Leiden    REVIEW OF SYSTEMS:   Review of Systems   Constitutional:  Positive for fatigue. Negative for chills and fever.   Eyes: Negative.  Negative for visual disturbance.   Respiratory: Negative.  Negative for cough, shortness of breath and wheezing.    Cardiovascular: Negative.  Negative for chest pain.   Gastrointestinal:  Positive for  abdominal distention, constipation and diarrhea. Negative for nausea and vomiting.   Endocrine: Positive for cold intolerance.   Genitourinary: Negative.    Musculoskeletal: Negative.    Skin: Negative.    Allergic/Immunologic: Negative.    Neurological: Negative.  Negative for weakness and headaches.   Hematological: Negative.    Psychiatric/Behavioral:  Positive for sleep disturbance. The patient is nervous/anxious.             FAMILY HISTORY:      Family History   Problem Relation Age of Onset    Hypertension Father     High Cholesterol Father     Breast Cancer Mother 64    Heart Attack Paternal Grandfather     Suicide History Maternal Cousin Male        MEDICAL HISTORY:     Past Medical History:    Allergic rhinitis    Anxiety state, unspecified    Dehydration    Depressive disorder, not elsewhere classified    Diarrhea    Dizziness and giddiness    Eating disorder, unspecified    Factor 5 Leiden mutation, heterozygous (HCC)    Gastroenteritis    Headache(784.0)    Heartburn    Hematuria    w abdominal pain    Hydronephrosis    mild right and hydrooureter    Hypothyroidism    Hypothyroidism    Lipid screening    Other unknown and unspecified cause of morbidity or mortality    Panic disorder    Paresthesia    Strep pharyngitis    acute    Substance abuse (HCC)    Suicide attempt (HCC)    overdose    Tooth infection    wisdom    Unspecified adjustment reaction    Urgency of urination    Vertigo    chronic    Viral syndrome    Vomiting       CURRENT MEDICATIONS:     Current Outpatient Medications   Medication Sig Dispense Refill    buPROPion 75 MG Oral Tab Take 1 tablet (75 mg total) by mouth daily. 90 tablet 0    thyroid (NP THYROID) 30 MG Oral Tab Take 1 tablet (30 mg total) by mouth daily. Take with 15mg tablet. Take at least 30-60min away from other food or supplements. 30 tablet 2    thyroid (NP THYROID) 15 MG Oral Tab Take 1 tablet (15 mg total) by mouth daily. Take with 30mg tablet. 30 tablet 2    MAGNESIUM  OR Take by mouth.      Multiple Vitamin (MULTIVITAMIN ADULT OR) Take by mouth.      PREBIOTIC PRODUCT OR Take by mouth.      Probiotic Product (PROBIOTIC OR) Take by mouth. (Patient not taking: Reported on 2/14/2024)         SOCIAL HISTORY:   Lifestyle Factors affecting health:    Diet - Trying to Increase fiber to 25-30g and protein.  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[employer]  Son, 22yo   Social History     Socioeconomic History    Marital status:    Tobacco Use    Smoking status: Never    Smokeless tobacco: Never   Vaping Use    Vaping status: Never Used   Substance and Sexual Activity    Alcohol use: Yes     Comment: social, has not had since the end of March 2023    Drug use: No   Other Topics Concern    Caffeine Concern Yes    Exercise Yes    Seat Belt Yes       SURGICAL HISTORY:     Past Surgical History:   Procedure Laterality Date    Colposcopy,loop electrd cervix excis N/A 1/25/2016    Procedure: LOOP ELECTRICAL EXCISION CERVIX,COLPOSCOPY;  Surgeon: David Rodriguez MD;  Location: [facility]    Hysteroscopy,with sampling N/A 10/21/2015    Procedure: HYSTEROSCOPY W/DILATION AND CURETTAGE;  Surgeon: David Rodriguez MD;  Location: [facility]       PHYSICAL EXAM:     There were no vitals filed for this visit.      Physical Exam  Constitutional:       General: She is not in acute distress.     Appearance: Normal appearance. She is not ill-appearing or toxic-appearing.   HENT:      Head: Normocephalic and atraumatic.   Eyes:      Extraocular Movements: Extraocular movements intact.   Pulmonary:      Effort: Pulmonary effort is normal. No respiratory distress.   Musculoskeletal:      Cervical back: Normal range of motion.   Skin:     Coloration: Skin is not  jaundiced.      Findings: No lesion.   Neurological:      Mental Status: She is alert and oriented to person, place, and time.   Psychiatric:         Mood and Affect: Mood normal.         Behavior: Behavior normal.         Thought Content: Thought content normal.         Judgment: Judgment normal.          ASSESSMENT AND PLAN:     [facility] Lab Encounter on 02/27/2024   Component Date Value Ref Range Status    Pregnenolone 02/27/2024 159 (H)  ng/dL Final    This test was developed and its performance characteristics  determined by [facility]. It has not been cleared or approved  by the Food and Drug Administration.  Reference Range:  Adults: <151    Immunoglobulin A 02/27/2024 262.00  70.00 - 312.00 mg/dL Final    Vitamin D, 25OH, Total 02/27/2024 34.2  30.0 - 100.0 ng/mL Final    Literature Recommendations for 25(OH)D levels are:  Range           Vitamin D Status   <20    ng/mL      Deficiency   20-<30 ng/mL      Insufficiency    ng/mL      Sufficiency   >100   ng/mL      Toxicity    *Clinical controversy exists regarding optimal 25(OH)D levels. Emerging evidence links potential adverse effects to high levels, particularly >60 ng/mL.        Tissue Transglutaminase IgA Ab 02/27/2024 0.4  <7.0 U/mL Final   Office Visit on 02/14/2024   Component Date Value Ref Range Status    DHEA Sulfate 02/27/2024 91.7  25.9 - 460.2 ug/dL Final    This test may exhibit interference of greater than 10% when a sample is collected from a person who is consuming high dose of biotin (a.k.a., vitamin B7, vitamin H, coenzyme R) supplements resulting in serum concentrations &gt;12.5 ng/mL, leading to either falsely elevated or falsely depressed results.  Intake of the recommended daily allowance (RDA) for biotin (0.03 mg) has not been shown to typically cause significant interference; however, high dose daily dietary supplements may contain biotin concentrations greater than 150 times (5-10 mg) the RDA.  It is recommended that physicians ask  all patients who may be on biotin supplementation to stop biotin consumption at least 72 hours prior to collection of a new sample.    Estradiol 02/27/2024 203.9  No established range for female sex pg/mL Final    Follicular                             21.4-164.8  pg/mL  Midcycle                               49.9-367.2  pg/mL  Luteal                                 40.2-259.0  pg/mL  Postmenopausal on Hormone Therapy      <462.1      pg/mL  Postmenopausal not on Hormone Therapy  <58.3       pg/mL  This test may exhibit interference when a sample is collected from a person who is consuming high dose of biotin (a.k.a., vitamin B7, vitamin H, coenzyme R) supplements resulting in serum concentrations >100 ng/mL.  Intake of the recommended daily allowance (RDA) for biotin (0.03 mg) has not been shown to typically cause significant interference; however, high dose daily dietary supplements may contain biotin concentrations greater than 150 times (5-10 mg) the RDA.  It is recommended that physicians ask all patients who may be on biotin supplementation to stop biotin consumption at least 72 hours prior to collection of a new sample.      Progesterone 02/27/2024 9.26  No established range for female sex ng/mL Final    Normal Females   Follicular       0.210 - 1.70   ng/mL   Luteal           2.25 - 24.2    ng/mL   Mid-Luteal       8.76 - 21.6    ng/mL   Post Menopausal  <0.200 - 0.901 ng/mL    Pregnant Females   First Trimester  11.4 - 41.0   ng/mL   Second Trimester 13.8 - 156.0  ng/mL   Third Trimester  51.4 - >200.0 ng/mL  This test may exhibit interference when a sample is collected from a person who is consuming high dose of biotin (a.k.a., vitamin B7, vitamin H, coenzyme R) supplements resulting in serum concentrations >100 ng/mL.  Intake of the recommended daily allowance (RDA) for biotin (0.03 mg) has not been shown to typically cause significant interference; however, high dose daily dietary supplements may contain  biotin concentrations greater than 150 times (5-10 mg) the RDA.  It is recommended that physicians ask all patients who may be on biotin supplementation to stop biotin consumption at least 72 hours prior to collection of a new sample.      Testosterone Tot LC/MS 02/27/2024 30.0  ng/dL Final                             Female:                            Premenopausal    10.0 - 55.0                            Postmenopausal    7.0 - 40.0    Testost % Free+Weak Bnd 02/27/2024 5.7  3.0 - 18.0 % Final    **Results verified by repeat testing**  This test was developed and its performance characteristics  determined by [facility]. It has not been cleared or approved  by the Food and Drug Administration.    Testost Free+Weak Bnd 02/27/2024 1.7  0.0 - 9.5 ng/dL Final    Sex Horm Bind Glob 02/27/2024 176.0 (H)  24.6 - 122.0 nmol/L Final    TSH 02/27/2024 3.470  0.358 - 3.740 mIU/mL Final    This test may exhibit interference when a sample is collected from a person who is consuming high dose of biotin (a.k.a., vitamin B7, vitamin H, coenzyme R) supplements resulting in serum concentrations >100 ng/mL.  Intake of the recommended daily allowance (RDA) for biotin (0.03 mg) has not been shown to typically cause significant interference; however, high dose daily dietary supplements may contain biotin concentrations greater than 150 times (5-10 mg) the RDA.  It is recommended that physicians ask all patients who may be on biotin supplementation to stop biotin consumption at least 72 hours prior to collection of a new sample.      T3 Free 02/27/2024 2.25 (L)  2.40 - 4.20 pg/mL Final    This test may exhibit interference when a sample is collected from a person who is consuming high dose of biotin (a.k.a., vitamin B7, vitamin H, coenzyme R) supplements resulting in serum concentrations >100 ng/mL.  Intake of the recommended daily allowance (RDA) for biotin (0.03 mg) has not been shown to typically cause significant interference;  however, high dose daily dietary supplements may contain biotin concentrations greater than 150 times (5-10 mg) the RDA.  It is recommended that physicians ask all patients who may be on biotin supplementation to stop biotin consumption at least 72 hours prior to collection of a new sample.      Free T4 02/27/2024 1.0  0.8 - 1.7 ng/dL Final    If applicable: Pregnancy Reference Intervals  First trimester 10-13 weeks gestation    0.9-1.4 ng/dL  Second trimester 14-26 weeks gestation   0.7-1.3 ng/dL      This test may exhibit interference when a sample is collected from a person who is consuming high dose of biotin (a.k.a., vitamin B7, vitamin H, coenzyme R) supplements resulting in serum concentrations >100 ng/mL.  Intake of the recommended daily allowance (RDA) for biotin (0.03 mg) has not been shown to typically cause significant interference; however, high dose daily dietary supplements may contain biotin concentrations greater than 150 times (5-10 mg) the RDA.  It is recommended that physicians ask all patients who may be on biotin supplementation to stop biotin consumption at least 72 hours prior to collection of a new sample.      Reverse T3 02/27/2024 12.5  9.2 - 24.1 ng/dL Final    Anti-Thyroglobulin 02/27/2024 <15  <60 U/mL Final    This test may exhibit interference when a sample is collected from a person who is consuming high dose of biotin (a.k.a., vitamin B7, vitamin H, coenzyme R) supplements resulting in serum concentrations >=100 ng/mL, leading to falsely depressed Thyroglobulin results (32% - 37% decrease).  Intake of the recommended daily allowance (RDA) for biotin (0.03 mg) has not been shown to typically cause significant interference; however, high dose daily dietary supplements may contain biotin concentrations greater than 150 times (5-10 mg) the RDA.  It is recommended that physicians ask all patients who may be on biotin supplementation to stop biotin consumption at least 72 hours prior to  collection of a new sample.      Anti-Thyroperoxidase 02/27/2024 109 (H)  <60 U/mL Final    Vitamin B12 02/27/2024 483  193 - 986 pg/mL Final    Vitamin B12 Reference Range      Deficient:      <150 pg/mL      Indeterminate   150-192 pg/mL      Normal:         193-986 pg/mL      This test may exhibit interference when a sample is collected from a person who is consuming high dose of biotin (a.k.a., vitamin B7, vitamin H, coenzyme R) supplements resulting in serum concentrations >100 ng/mL.  Intake of the recommended daily allowance (RDA) for biotin (0.03 mg) has not been shown to typically cause significant interference; however, high dose daily dietary supplements may contain biotin concentrations greater than 150 times (5-10 mg) the RDA.  It is recommended that physicians ask all patients who may be on biotin supplementation to stop biotin consumption at least 72 hours prior to collection of a new sample.      HgbA1C 02/27/2024 5.7 (H)  <5.7 % Final     Normal HbA1C:     <5.7%      Pre-Diabetic:     5.7 - 6.4%      Diabetic:         >6.4%      Diabetic Control: <7.0%        Estimated Average Glucose 02/27/2024 117  68 - 126 mg/dL Final    eAG is the estimated average glucose calculated from Hgb A1c according to the formula recommended by the American Diabetes Association. eAG levels reflect the long term average glucose and may not correlate with random or fasting glucose levels since these represent specific points in time.           Insulin 02/27/2024 4.2  3.0 - 25.0 mU/L Final    DQ2 (DQA1 0501/0505,DQB1 02XX) 02/27/2024 Negative   Final    DQ8 (DQA1 03XX, DQB1 0302) 02/27/2024 Negative   Final    Comment: Final Results:  DQB1*06:KATHY,06:EENTM  DQA1*01:KIERSTEN01:EENTG  Code Translation:  KIERSTEN            01:02/01:05/01:08/01:09/01:11/01:16N/01:19 /01:20/01:21/01:23/01:25/01:28/01:31/01:32 /01:38/01:39/01:40Q/01:41/01:42/01:46                    /01:48/01:51/01:52/01:54/01:62/01:63/01:69                   /01:71/01:72/01:73/01:75/01:85/01:90/01:91                   /01:92/01:93/01:94/01:100  EENTG            01:03/01:10/01:14/01:15N/01:17/01:30/01:45                   /01:47/01:50/01:57/01:65/01:68/01:70/01:76                   /01:78/01:79/01:82/01:84/01:87/01:97  EENTJ            06:02/06:16/06:19/06:24/06:46/06:47/06:71                   /06:72/06:73/06:74/06:75N/06:76/06:77N                   /06:78/06:80/06:84/06:95/06:96/06:97                   /06:106/06:107/06:109/06:111/06:113/06:114                   /06:115/06:116/06:117/06:124/06:125/06:126                   /06:127/06:136/06:137/06:138/06:146/06:147                                              /06:150/06:152/06:159/06:161/06:163/06:166                   /06:173/06:175/06:178/06:179N/06:182                   /06:183/06:188/06:192/06:197/06:198/06:200                   /06:211/06:213/06:215/06:216N/06:219                   /06:224/06:225/06:226/06:227/06:228/06:232                   /06:235/06:236/06:237/06:240/06:242/06:249                   /06:255/06:256/06:262/06:264/06:271/06:273                   /06:284/06:286/06:289/06:290/06:293/06:294                   /06:295/06:296/06:297/06:298/06:300                   /06:304N/06:306N/06:308N/06:311/06:314                   /06:315/06:317N/06:324/06:326/06:333                   /06:335/06:338/06:341N/06:344/06:347                   /06:354/06:355/06:356/06:357/06:363/06:364                   /06:366/06:370/06:372/06:374/06:376/06:380                   /06:384/06:386/06:388/06:390/06:395                   /06:397N/06:401/06:402/06:404/06:405                   /06:406/06:408/06:409/06:411/06:412/06:413                                              /06:416Q/06:422N/06:430/06:431/06:436                   /06:437/06:438  EENTM            06:03/06:30/06:41/06:44/06:59/06:61/06:62                   /06:64/06:65/06:87/06:90/06:91/06:92                    /06:110/06:128/06:133/06:134/06:141                   /06:144N/06:148/06:170/06:184/06:185                   /06:187/06:190/06:191/06:196/06:199/06:203                   /06:210/06:218/06:221/06:222/06:223/06:234                   /06:238/06:248/06:250/06:253/06:259/06:272                   /06:279/06:316/06:327/06:328/06:329/06:331                   /06:334/06:336/06:345N/06:346/06:350                   /06:352/06:360/06:362/06:365/06:367/06:371                   /06:373/06:385/06:391/06:392/06:394N                   /06:396/06:403/06:410/06:423N/06:424                   /06:425/06:428/06:433/06:440  The patient is not positive for any of the HLA DQ risk  alleles.  Celiac Disease risk from the HLA DQA/DQB  genotype is approximately 1:2518 (<0.04%).  Allele interpretation for all loci                            based on IMGT/HLA  database version 3.49.0  HLA Lab [place] ID Number [id number]  Greater than 95% of celiac patients are positive for either DQ2 or DQ8  (Nancy and Eric, (1993)  Gastroenterology 105:910-922). However  these antigens may also be present in patients who do not have Celiac  disease.    Comment Celiac HLA 02/27/2024 Comment   Final    This test was performed using Polymerase Chain Reaction (PCR) and  Sequence Specific Oligonucleotide Probes (SSOP) ([employer]) technique.  Sequence Based Typing (SBT) may be used as a supplemental method  when necessary.  If you have questions, please call HLA customer service at  [phone] or email at [email or web address].    Addit Info  Celiac HLA 02/27/2024 Comment   Final    References:  1. Prudencio SUMMERS and Martha ELIAS. Celiac Disease. N Eng J Med 2007;     357:7598-2690.  2. Alicia PEREZ, Ene SANFORD, Bonamico M et al. HLA-DQ and risk gradient     for celiac disease. Hum Immunol 2009; 70:55-59.  3. Kiley MM, Daron TC, Moses BENTON et al. Stratifying risk     for celiac disease in a large at-risk [place] population     by using HLA alleles. Clin Gastroenterol  Hepatol 2009; 7:966-971.  4. Nancy HURTADO and Reba BA. (2005). Celiac Disease Genetics: Current     Concepts and Practical Applications. Clin Gastroenterol and     Hepat 3:843-851.  5. Isreal CL, Abdi DO, Prudencio SUMMERS, et al. Celiac Disease.     In: Tal RA, Anton TC, Florentin CR, Urszula K, editors. [employer]), [facility], [place], July 3, 2008:1-27.     http://[email or web address].nlm.nih.gov/MyFreightWorldhelf/br.fcgi?book=genepart=celiac     PMID 71297954 (PubMed)  6. Lakisha W. Emerging concepts in celiac disease. Curr Opin Pediatr     2004;16:552-559.    Glucose 02/27/2024 94  70 - 99 mg/dL Final    Sodium 02/27/2024 137  136 - 145 mmol/L Final    Potassium 02/27/2024 3.5  3.5 - 5.1 mmol/L Final    Chloride 02/27/2024 109  98 - 112 mmol/L Final    CO2 02/27/2024 26.0  21.0 - 32.0 mmol/L Final    Anion Gap 02/27/2024 2  0 - 18 mmol/L Final    BUN 02/27/2024 18  9 - 23 mg/dL Final    Creatinine 02/27/2024 0.93  0.55 - 1.02 mg/dL Final    Calcium, Total 02/27/2024 9.0  8.5 - 10.1 mg/dL Final    Calculated Osmolality 02/27/2024 286  275 - 295 mOsm/kg Final    eGFR-Cr 02/27/2024 76  >=60 mL/min/1.73m2 Final    AST 02/27/2024 23  15 - 37 U/L Final    ALT 02/27/2024 15  13 - 56 U/L Final    Alkaline Phosphatase 02/27/2024 57  39 - 100 U/L Final    Bilirubin, Total 02/27/2024 0.4  0.1 - 2.0 mg/dL Final    Total Protein 02/27/2024 7.5  6.4 - 8.2 g/dL Final    Albumin 02/27/2024 3.9  3.4 - 5.0 g/dL Final    Globulin  02/27/2024 3.6  2.8 - 4.4 g/dL Final    A/G Ratio 02/27/2024 1.1  1.0 - 2.0 Final    Patient Fasting for CMP? 02/27/2024 Yes   Final    Folate (Folic Acid) 02/27/2024 22.3  >=8.7 ng/mL Final    This test may exhibit interference when a sample is collected from a person who is consuming high dose of biotin (a.k.a., vitamin B7, vitamin H, coenzyme R) supplements resulting in serum concentrations >100 ng/mL.  Intake of the recommended daily allowance (RDA) for biotin (0.03 mg) has not been shown to typically  cause significant interference; however, high dose daily dietary supplements may contain biotin concentrations greater than 150 times (5-10 mg) the RDA.  It is recommended that physicians ask all patients who may be on biotin supplementation to stop biotin consumption at least 72 hours prior to collection of a new sample.        No results found.    1. Hypothyroidism due to Hashimoto's thyroiditis    2. Other insomnia    3. Abdominal bloating    4. Food intolerance    5. Endocrine axis dysfunction  - Homocysteine    6. Deficiency of multiple nutrient elements  - Zinc, RBC; Future  - Homocysteine    7. Low serum vitamin B12  - Vitamin B12  - Homocysteine      This visit was conducted using Telemedicine with live, interactive video and audio.   The patient understands the risks and benefits of Telemedicine and that a Telemedicine visit limits the ability to perform a thorough physical examination which may affect objective findings related to specific symptoms and conditions which can, in turn, affect treatment.     The [facility] in [facility] at the time of the encounter.      Time spent with patient: Over 45 minutes spent in chart review and in direct communication with patient obtaining and reviewing history, creating a unique care plan, explaining the rationale for treatment, reviewing potential SE and overall treatment plan,  documenting all clinical information in Epic. Over 50% of this time was in education, counseling and coordination of care.     Problem List Items Addressed This Visit    None  Visit Diagnoses       Hypothyroidism due to Hashimoto's thyroiditis    -  Primary    Other insomnia        Abdominal bloating        Food intolerance        Endocrine axis dysfunction        Relevant Orders    Homocysteine    Deficiency of multiple nutrient elements        Relevant Orders    Zinc, RBC    Homocysteine    Low serum vitamin B12        Relevant Orders    Vitamin B12    Homocysteine                Endocrine:  Hypothyroidism with elevated TPO antibodies-started the NP thyroid, but has not gotten labs yet to check hormone levels.    -> Plan to get labs  -> Hcy to see if she needs certain methylation support, NAC    Hormone imbalance -low progesterone, high normal estradiol, high SHBG.   Discussed how symptoms may improve once thyroid is optimally managed.     GI:  IBS - C>D with abdominal bloating.    Slightly better since starting new thyroid medication and eliminating foods that tested high on the KB MO fit test (see download).  4+ reaction to casein, wheat, cranberry, cinnamon, garlic, pine nut.  3+ reaction to cows milk, whey, eggs, wheat, and Azael seed.  -> Discussed how to reintroduce foods after 6 weeks to 3 months.  -> Recommend Gluda shield to repair IP    Cardiometabolic:  Prediabetes with recent hemoglobin A1c at 5.7.  -Started inositol with selenium to help with blood sugar regulation and anxiety    HPA Axis: Increased stress, anxiety and depression. Working with a therapist and on meds.   -DHEA was slightly low, recommend supplementing 5 mg every morning    Insomnia -will start to journal if FLORES is helping.    Given further recommendations as below    Orders Placed This Visit:  Orders Placed This Encounter   Procedures    Zinc, RBC    Vitamin B12    Homocysteine     No orders of the defined types were placed in this encounter.      Patient Instructions   GlutaShield by [employer]       The purpose of the intestinal epithelium (lining) is to allow the digestion and absorption of dietary nutrients while keeping unwanted toxins, microbes and food particles from passing directly into the body. GlutaShield includes a high dose of L-glutamine (4 g), which serves as nutrition for the gut lining. It provides 400 mg of deglycyrrhized licorice root extract (DGL) and 75 mg of aloe vera extract, both of which protect and promote the health of the gut mucosa. N-acetyl glucosamine and zinc  boost GI integrity. GlutaShield is available in delicious chocolate and vanilla flavors    Dose: 1 serving daily for 1 month       2. Similase GF CF with meals to help break down gluten  Similase GFCF by [employer]    Gluten and casein digestive enzymes, including DPP IV, to support the breakdown of gluten or casein.    Digestion of gluten and casein can be particularly difficult for some individuals. Similase GFCF contains enzymes to support comprehensive digestion, while also providing dipeptidyl peptidase IV (DPP IV) activity for the digestion of proline-containing dipeptides from gluten and casein. Similase GFCF supports a gluten-free, casein-free lifestyle and helps relieve occasional indigestion, gas and bloating.    Features Microbial Enzymes that are active in both acidic and alkaline environments    Take 2 capsules with each meal or as recommended by your healthcare professional.     3. DHEA 5mg by Pure Encapsulations    Pure Encapsulations DHEA is micronized to help increase absorption in the body. DHEA supports healthy regulation of fat and mineral metabolism, as well as endocrine and reproductive function, and energy levels.  Supports emotional well-being and immune function    Take 1 capsule at breakfast. If tolerated, increase to 10mg after 1week.    4. Fill out multiple symptom questionnaire (MSQ) to keep track of your symptoms over time.    5. Remove all foods for 6wks -3mos. Reintroduce foods as discussed. See handout.    Return in about 3 months (around 8/14/2024) for x60min: thyroid, ELimination diet, insomnia.    Patient affirmed understanding of plan and all questions were answered.     Bernarda Rivera PA-C

## 2024-05-15 ENCOUNTER — MED REC SCAN ONLY (OUTPATIENT)
Dept: INTEGRATIVE MEDICINE | Facility: CLINIC | Age: 49
End: 2024-05-15

## 2024-05-17 ENCOUNTER — LAB ENCOUNTER (OUTPATIENT)
Dept: LAB | Facility: HOSPITAL | Age: 49
End: 2024-05-17
Attending: PHYSICIAN ASSISTANT

## 2024-05-17 DIAGNOSIS — E61.7 DEFICIENCY OF MULTIPLE NUTRIENT ELEMENTS: ICD-10-CM

## 2024-05-17 LAB
HCYS SERPL-SCNC: 7.7 UMOL/L (ref 3.2–10.7)
T3FREE SERPL-MCNC: 1.71 PG/ML (ref 2.4–4.2)
T4 FREE SERPL-MCNC: 0.7 NG/DL (ref 0.8–1.7)
TSI SER-ACNC: 4.28 MIU/ML (ref 0.36–3.74)
VIT B12 SERPL-MCNC: 386 PG/ML (ref 193–986)

## 2024-05-17 PROCEDURE — 84443 ASSAY THYROID STIM HORMONE: CPT | Performed by: PHYSICIAN ASSISTANT

## 2024-05-17 PROCEDURE — 84482 T3 REVERSE: CPT | Performed by: PHYSICIAN ASSISTANT

## 2024-05-17 PROCEDURE — 84439 ASSAY OF FREE THYROXINE: CPT | Performed by: PHYSICIAN ASSISTANT

## 2024-05-17 PROCEDURE — 84481 FREE ASSAY (FT-3): CPT | Performed by: PHYSICIAN ASSISTANT

## 2024-05-17 PROCEDURE — 83090 ASSAY OF HOMOCYSTEINE: CPT | Performed by: PHYSICIAN ASSISTANT

## 2024-05-17 PROCEDURE — 36415 COLL VENOUS BLD VENIPUNCTURE: CPT | Performed by: PHYSICIAN ASSISTANT

## 2024-05-17 PROCEDURE — 84630 ASSAY OF ZINC: CPT

## 2024-05-17 PROCEDURE — 82607 VITAMIN B-12: CPT | Performed by: PHYSICIAN ASSISTANT

## 2024-05-20 ENCOUNTER — TELEPHONE (OUTPATIENT)
Dept: INTEGRATIVE MEDICINE | Facility: CLINIC | Age: 49
End: 2024-05-20

## 2024-05-20 DIAGNOSIS — E03.8 HYPOTHYROIDISM DUE TO HASHIMOTO'S THYROIDITIS: Primary | ICD-10-CM

## 2024-05-20 DIAGNOSIS — E06.3 HYPOTHYROIDISM DUE TO HASHIMOTO'S THYROIDITIS: Primary | ICD-10-CM

## 2024-05-20 LAB — ZINC, RBC: 1510 UG/DL

## 2024-05-20 RX ORDER — THYROID 60 MG/1
60 TABLET ORAL DAILY
Qty: 30 TABLET | Refills: 2 | Status: SHIPPED | OUTPATIENT
Start: 2024-05-20

## 2024-05-25 LAB — REVERSE T3: 6.9 NG/DL

## 2024-07-01 RX ORDER — THYROID 30 MG/1
30 TABLET ORAL DAILY
Qty: 90 TABLET | Refills: 0 | OUTPATIENT
Start: 2024-07-01

## 2024-07-17 ENCOUNTER — LAB ENCOUNTER (OUTPATIENT)
Dept: LAB | Facility: HOSPITAL | Age: 49
End: 2024-07-17
Attending: PHYSICIAN ASSISTANT
Payer: COMMERCIAL

## 2024-07-17 DIAGNOSIS — E06.3 HYPOTHYROIDISM DUE TO HASHIMOTO'S THYROIDITIS: ICD-10-CM

## 2024-07-17 LAB
T3FREE SERPL-MCNC: 2.62 PG/ML (ref 2.4–4.2)
T4 FREE SERPL-MCNC: 0.8 NG/DL (ref 0.8–1.7)
THYROGLOB SERPL-MCNC: <15 U/ML (ref ?–60)
THYROPEROXIDASE AB SERPL-ACNC: 103 U/ML (ref ?–60)
TSI SER-ACNC: 7.2 MIU/ML (ref 0.55–4.78)

## 2024-07-17 PROCEDURE — 36415 COLL VENOUS BLD VENIPUNCTURE: CPT

## 2024-07-17 PROCEDURE — 84481 FREE ASSAY (FT-3): CPT

## 2024-07-17 PROCEDURE — 84439 ASSAY OF FREE THYROXINE: CPT

## 2024-07-17 PROCEDURE — 86376 MICROSOMAL ANTIBODY EACH: CPT

## 2024-07-17 PROCEDURE — 86800 THYROGLOBULIN ANTIBODY: CPT

## 2024-07-17 PROCEDURE — 84482 T3 REVERSE: CPT

## 2024-07-17 PROCEDURE — 84443 ASSAY THYROID STIM HORMONE: CPT

## 2024-07-22 LAB — REVERSE T3: 8.3 NG/DL

## 2024-08-12 NOTE — TELEPHONE ENCOUNTER
A refill request was received for:  Requested Prescriptions     Pending Prescriptions Disp Refills    thyroid (NP THYROID) 15 MG Oral Tab 90 tablet 0     Sig: Take 1 tablet (15 mg total) by mouth daily.    THYROID 60 MG Oral Tab [Pharmacy Med Name: THYROID 60 MG TABLET] 90 tablet 0     Sig: TAKE 1 TABLET BY MOUTH EVERY DAY     Last refill date:  5/20/24   Qty: 30 tablet and 2   Dx: hypothyroidism  Last office visit: 5/14/24     Future Appointments   Date Time Provider Department Center   8/13/2024  3:00 PM Pamela Almeida APRN [id number]   8/21/2024  2:00 PM Bernarda Rivera PA-C [facility] [place]

## 2024-08-13 RX ORDER — LEVOTHYROXINE, LIOTHYRONINE 38; 9 UG/1; UG/1
60 TABLET ORAL DAILY
Qty: 90 TABLET | Refills: 0 | Status: SHIPPED | OUTPATIENT
Start: 2024-08-13

## 2024-08-13 RX ORDER — LEVOTHYROXINE, LIOTHYRONINE 9.5; 2.25 UG/1; UG/1
15 TABLET ORAL DAILY
Qty: 90 TABLET | Refills: 0 | Status: SHIPPED | OUTPATIENT
Start: 2024-08-13

## 2024-08-21 ENCOUNTER — TELEMEDICINE (OUTPATIENT)
Dept: INTEGRATIVE MEDICINE | Facility: CLINIC | Age: 49
End: 2024-08-21
Payer: COMMERCIAL

## 2024-08-21 DIAGNOSIS — K58.2 IRRITABLE BOWEL SYNDROME WITH BOTH CONSTIPATION AND DIARRHEA: ICD-10-CM

## 2024-08-21 DIAGNOSIS — G47.00 INSOMNIA, UNSPECIFIED TYPE: ICD-10-CM

## 2024-08-21 DIAGNOSIS — R53.83 OTHER FATIGUE: ICD-10-CM

## 2024-08-21 DIAGNOSIS — E34.8 ENDOCRINE AXIS DYSFUNCTION: ICD-10-CM

## 2024-08-21 DIAGNOSIS — E06.3 HYPOTHYROIDISM DUE TO HASHIMOTO'S THYROIDITIS: Primary | ICD-10-CM

## 2024-08-21 DIAGNOSIS — F43.9 STRESS: ICD-10-CM

## 2024-08-21 DIAGNOSIS — R14.0 ABDOMINAL BLOATING: ICD-10-CM

## 2024-08-21 PROCEDURE — 99215 OFFICE O/P EST HI 40 MIN: CPT | Performed by: PHYSICIAN ASSISTANT

## 2024-08-21 NOTE — PROGRESS NOTES
Janee Mcknight is a 49 year old female.  Chief Complaint   Patient presents with    Follow - Up     Lab results       HPI:   50yo pt presents today via video to f/u.     Walking daily (10-12k steps) and completely changed diet but still feeling heavy, bloated, inflamed, brain fog.  Has energy in the morning and then no motivation as day goes on.  Significant amount of stress recently.  Hurt elbow so has not been doing strength training/weights anymore    Did add the 15mg of NP Thyroid in the afternoon, 60mg in the AM. Not noticed much of a difference.    Keeping up with food eliminations.   BM now more regular  +Bloating - but admits anxious even when eating    Menses - feels like she has PMS majority of the month. Swollen breasts, tired, crampy.     Sleeping ok - taking the FLORES and Mg    --------------------------------------------Last OV------------------------------------    Removed soy, corn, gluten, caffeine, and dairy since reading Hashimoto's protocol. Then started to remove all the higher reactive foods on the FIT test x3wks.     Fatigue and brain fog and weight loss resistance persists.   GI better.  Sleeping better.     Started the Vit D and FLORES at night x3-4weeks.  Started a wellness journal.     Anxiety (towards end of day - hard to turn brain off), insomnia, brain fog, energy fades in afternoon.   On Wellbutrin and in therapy twice weekly.   H/o depression and anxiety, but worse in the last year after significant stressors. Able to do daily routines but not motivated to go out much.     Hypothyroid - Switched from Levothyroxine (on for over 20yrs) to NP thyroid x 5 weeks ago. PCP had been managing.   ----------------------------------------------------------    Menses mostly regular, perhaps shortening cycles, every 3-4weeks approx  - flow is heavy at times and 1 day of significant cramping. More emotional lately.   Has \"Better hormones\" drink daily that may have chasteberry, ashwagandha, and inositol  (250mg) x1yr    Insomnia - Will wake up more frequently and with night terrors.  works night shift x20yrs.    Weight loss resistance - Started berberine x2-3wks to help with blood sugar but has not been taking regularly.    IBS-C>D, bloating - stools have been more regular.   BM consistency changes a lot, but not typically pellets    H/o Factor V Leiden    REVIEW OF SYSTEMS:   Review of Systems     Negative except for pertinent ROS in HPI      FAMILY HISTORY:      Family History   Problem Relation Age of Onset    Hypertension Father     High Cholesterol Father     Breast Cancer Mother 64    Heart Attack Paternal Grandfather     Suicide History Maternal Cousin Male        MEDICAL HISTORY:     Past Medical History:    Allergic rhinitis    Anxiety state, unspecified    Dehydration    Depressive disorder, not elsewhere classified    Diarrhea    Dizziness and giddiness    Eating disorder, unspecified    Factor 5 Leiden mutation, heterozygous (HCC)    Gastroenteritis    Headache(784.0)    Heartburn    Hematuria    w abdominal pain    Hydronephrosis    mild right and hydrooureter    Hypothyroidism    Hypothyroidism    Lipid screening    Other unknown and unspecified cause of morbidity or mortality    Panic disorder    Paresthesia    Strep pharyngitis    acute    Substance abuse (HCC)    Suicide attempt (HCC)    overdose    Tooth infection    wisdom    Unspecified adjustment reaction    Urgency of urination    Vertigo    chronic    Viral syndrome    Vomiting       CURRENT MEDICATIONS:     Current Outpatient Medications   Medication Sig Dispense Refill    NP THYROID 15 MG Oral Tab Take 1 tablet (15 mg total) by mouth daily. 90 tablet 0    NP THYROID 60 MG Oral Tab Take 1 tablet (60 mg total) by mouth daily. 90 tablet 0    buPROPion 75 MG Oral Tab Take 1 tablet (75 mg total) by mouth daily. 90 tablet 0    MAGNESIUM OR Take by mouth.      Multiple Vitamin (MULTIVITAMIN ADULT OR) Take by mouth.      PREBIOTIC PRODUCT OR Take by  mouth.      Probiotic Product (PROBIOTIC OR) Take by mouth.         SOCIAL HISTORY:   Lifestyle Factors affecting health:    Diet - Trying to Increase fiber to 25-30g and protein.  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[employer]  Son, 22yo   Social History     Socioeconomic History    Marital status:    Tobacco Use    Smoking status: Never    Smokeless tobacco: Never   Vaping Use    Vaping status: Never Used   Substance and Sexual Activity    Alcohol use: Yes     Comment: social, has not had since the end of March 2023    Drug use: No   Other Topics Concern    Caffeine Concern Yes    Exercise Yes    Seat Belt Yes       SURGICAL HISTORY:     Past Surgical History:   Procedure Laterality Date    Colposcopy,loop electrd cervix excis N/A 1/25/2016    Procedure: LOOP ELECTRICAL EXCISION CERVIX,COLPOSCOPY;  Surgeon: David Rodriguez MD;  Location: [facility]    Hysteroscopy,with sampling N/A 10/21/2015    Procedure: HYSTEROSCOPY W/DILATION AND CURETTAGE;  Surgeon: David Rodriguez MD;  Location: [facility]       PHYSICAL EXAM:     There were no vitals filed for this visit.      Physical Exam  Constitutional:       General: She is not in acute distress.     Appearance: Normal appearance. She is not ill-appearing or toxic-appearing.   HENT:      Head: Normocephalic and atraumatic.   Eyes:      Extraocular Movements: Extraocular movements intact.   Pulmonary:      Effort: Pulmonary effort is normal. No respiratory distress.   Musculoskeletal:      Cervical back: Normal range of motion.   Skin:     Coloration: Skin is not jaundiced.      Findings: No lesion.   Neurological:      Mental Status: She is alert and oriented to person, place, and time.   Psychiatric:         Mood and  Affect: Mood normal.         Behavior: Behavior normal.         Thought Content: Thought content normal.         Judgment: Judgment normal.          ASSESSMENT AND PLAN:     [facility] Lab Encounter on 07/17/2024   Component Date Value Ref Range Status    TSH 07/17/2024 7.203 (H)  0.550 - 4.780 mIU/mL Final    T3 Free 07/17/2024 2.62  2.40 - 4.20 pg/mL Final    Free T4 07/17/2024 0.8  0.8 - 1.7 ng/dL Final    Reverse T3 07/17/2024 8.3 (L)  9.2 - 24.1 ng/dL Final    Anti-Thyroglobulin 07/17/2024 <15  <60 U/mL Final    Anti-Thyroperoxidase 07/17/2024 103 (H)  <60 U/mL Final   [facility] Lab Encounter on 05/17/2024   Component Date Value Ref Range Status    Zinc, RBC 05/17/2024 1510  878 - 1660 ug/dL Final    This test was developed and its performance characteristics  determined by [employer]. It has not been cleared or approved  by the Food and Drug Administration.   Telemedicine on 05/14/2024   Component Date Value Ref Range Status    Vitamin B12 05/17/2024 386  193 - 986 pg/mL Final    Vitamin B12 Reference Range      Deficient:      <150 pg/mL      Indeterminate   150-192 pg/mL      Normal:         193-986 pg/mL      This test may exhibit interference when a sample is collected from a person who is consuming high dose of biotin (a.k.a., vitamin B7, vitamin H, coenzyme R) supplements resulting in serum concentrations >100 ng/mL.  Intake of the recommended daily allowance (RDA) for biotin (0.03 mg) has not been shown to typically cause significant interference; however, high dose daily dietary supplements may contain biotin concentrations greater than 150 times (5-10 mg) the RDA.  It is recommended that physicians ask all patients who may be on biotin supplementation to stop biotin consumption at least 72 hours prior to collection of a new sample.      Homocysteine 05/17/2024 7.7  3.2 - 10.7 umol/L Final   Telemedicine on 04/03/2024   Component Date Value Ref Range Status    TSH 05/17/2024 4.280 (H)  0.358 - 3.740 mIU/mL Final     This test may exhibit interference when a sample is collected from a person who is consuming high dose of biotin (a.k.a., vitamin B7, vitamin H, coenzyme R) supplements resulting in serum concentrations >100 ng/mL.  Intake of the recommended daily allowance (RDA) for biotin (0.03 mg) has not been shown to typically cause significant interference; however, high dose daily dietary supplements may contain biotin concentrations greater than 150 times (5-10 mg) the RDA.  It is recommended that physicians ask all patients who may be on biotin supplementation to stop biotin consumption at least 72 hours prior to collection of a new sample.      T3 Free 05/17/2024 1.71 (L)  2.40 - 4.20 pg/mL Final    This test may exhibit interference when a sample is collected from a person who is consuming high dose of biotin (a.k.a., vitamin B7, vitamin H, coenzyme R) supplements resulting in serum concentrations >100 ng/mL.  Intake of the recommended daily allowance (RDA) for biotin (0.03 mg) has not been shown to typically cause significant interference; however, high dose daily dietary supplements may contain biotin concentrations greater than 150 times (5-10 mg) the RDA.  It is recommended that physicians ask all patients who may be on biotin supplementation to stop biotin consumption at least 72 hours prior to collection of a new sample.      Free T4 05/17/2024 0.7 (L)  0.8 - 1.7 ng/dL Final    If applicable: Pregnancy Reference Intervals  First trimester 10-13 weeks gestation    0.9-1.4 ng/dL  Second trimester 14-26 weeks gestation   0.7-1.3 ng/dL      This test may exhibit interference when a sample is collected from a person who is consuming high dose of biotin (a.k.a., vitamin B7, vitamin H, coenzyme R) supplements resulting in serum concentrations >100 ng/mL.  Intake of the recommended daily allowance (RDA) for biotin (0.03 mg) has not been shown to typically cause significant interference; however, high dose daily dietary  supplements may contain biotin concentrations greater than 150 times (5-10 mg) the RDA.  It is recommended that physicians ask all patients who may be on biotin supplementation to stop biotin consumption at least 72 hours prior to collection of a new sample.      Reverse T3 05/17/2024 6.9 (L)  9.2 - 24.1 ng/dL Final   [facility] Lab Encounter on 02/27/2024   Component Date Value Ref Range Status    Pregnenolone 02/27/2024 159 (H)  ng/dL Final    This test was developed and its performance characteristics  determined by [facility] [place]. It has not been cleared or approved  by the Food and Drug Administration.  Reference Range:  Adults: <151    Immunoglobulin A 02/27/2024 262.00  70.00 - 312.00 mg/dL Final    Vitamin D, 25OH, Total 02/27/2024 34.2  30.0 - 100.0 ng/mL Final    Literature Recommendations for 25(OH)D levels are:  Range           Vitamin D Status   <20    ng/mL      Deficiency   20-<30 ng/mL      Insufficiency    ng/mL      Sufficiency   >100   ng/mL      Toxicity    *Clinical controversy exists regarding optimal 25(OH)D levels. Emerging evidence links potential adverse effects to high levels, particularly >60 ng/mL.        Tissue Transglutaminase IgA Ab 02/27/2024 0.4  <7.0 U/mL Final      No results found.    1. Hypothyroidism due to Hashimoto's thyroiditis  - Dehydroepiandrosterone Sulfate; Future  - Pregnenolone by MS/MS, Serum; Future  - Free T4, (Free Thyroxine); Future  - Assay, Thyroid Stim Hormone; Future  - Free T3 (Triiodothryronine); Future  - Reverse T3, Serum; Future    2. Abdominal bloating    3. Stress    4. Irritable bowel syndrome with both constipation and diarrhea    5. Other fatigue    6. Endocrine axis dysfunction  - Dehydroepiandrosterone Sulfate; Future  - Pregnenolone by MS/MS, Serum; Future    7. Insomnia, unspecified type        This visit was conducted using Telemedicine with live, interactive video and audio.   The patient understands the risks and benefits of Telemedicine and that a  Telemedicine visit limits the ability to perform a thorough physical examination which may affect objective findings related to specific symptoms and conditions which can, in turn, affect treatment.     [facility] [place] [facility] the time of the encounter.      Time spent with patient: Over 45 minutes spent in chart review and in direct communication with patient obtaining and reviewing history, creating a unique care plan, explaining the rationale for treatment, reviewing potential SE and overall treatment plan,  documenting all clinical information in Epic. Over 50% of this time was in education, counseling and coordination of care.     Problem List Items Addressed This Visit    None  Visit Diagnoses       Hypothyroidism due to Hashimoto's thyroiditis    -  Primary    Relevant Orders    Dehydroepiandrosterone Sulfate    Pregnenolone by MS/MS, Serum    Free T4, (Free Thyroxine)    Assay, Thyroid Stim Hormone    Free T3 (Triiodothryronine)    Reverse T3, Serum    Abdominal bloating        Stress        Irritable bowel syndrome with both constipation and diarrhea        Other fatigue        Endocrine axis dysfunction        Relevant Orders    Dehydroepiandrosterone Sulfate    Pregnenolone by MS/MS, Serum    Insomnia, unspecified type                   Endocrine:  Hypothyroidism with elevated TPO antibodies-has been on NP thyroid since 4/3/24, but has not reached a therapeutic level symptomatically or in the labs yet.  -> Check labs in 2 weeks as she just increased by 15 mcg 2 weeks ago    Hormone imbalance -low progesterone, high normal estradiol, high SHBG.   Discussed how symptoms may improve once thyroid is optimally managed.   -> Recheck adrenal hormones since significant increase in stress more recently  -> Replace Ashwagandha with nu adapt, BID    GI:  IBS - C>D with abdominal bloating.    Slightly better since starting new thyroid medication and eliminating foods that tested high on  the [employer] fit test (see download).  -> May discontinue daily use of GlutaShield for now.  Okay to cycling as needed in the future.    [4+ reaction to casein, wheat, cranberry, cinnamon, garlic, pine nut.  3+ reaction to cows milk, whey, eggs, wheat, and Azael seed.]      Cardiometabolic:  Prediabetes-- hemoglobin A1c at 5.7.  -Started inositol with selenium to help with blood sugar regulation and anxiety    HPA Axis: Increased stress, anxiety and depression. Working with a therapist and on meds.   -DHEA low,  supplementing 5 mg every morning    Insomnia - improved    Given further recommendations as below    Orders Placed This Visit:  Orders Placed This Encounter   Procedures    Dehydroepiandrosterone Sulfate    Pregnenolone by MS/MS, Serum    Free T4, (Free Thyroxine)    Assay, Thyroid Stim Hormone    Free T3 (Triiodothryronine)    Reverse T3, Serum     No orders of the defined types were placed in this encounter.      Patient Instructions   Replace the Ashwagandha with:  NuAdapt by [employer]    NuAdapt contains a nootropic and a blend of adaptogenic botanicals and nutrients specifically formulated to counteract the effects of daily stress, improve mental performance, and support healthy energy levels.  2 capsules taken two times per day     2. Increase the Mitocore to 2 caps twice daily.     3. Repeat labs in 2weeks, hold the dose of thyroid morning of draw.    4.  No longer need to take the GlutaShield daily.  You may choose to cycle it in during stressful periods of time.       Return in about 3 months (around 11/21/2024) for x60min - fatigue, bloating, hypothyroid, incr stress.    Patient affirmed understanding of plan and all questions were answered.     Bernarda Rivera PA-C

## 2024-08-21 NOTE — PATIENT INSTRUCTIONS
Replace the Ashwagandha with:  NuAdapt by [employer]    NuAdapt contains a nootropic and a blend of adaptogenic botanicals and nutrients specifically formulated to counteract the effects of daily stress, improve mental performance, and support healthy energy levels.  2 capsules taken two times per day     2. Increase the Mitocore to 2 caps twice daily.     3. Repeat labs in 2weeks, hold the dose of thyroid morning of draw.    4.  No longer need to take the GlutaShield daily.  You may choose to cycle it in during stressful periods of time.

## 2024-09-03 ENCOUNTER — LAB ENCOUNTER (OUTPATIENT)
Dept: LAB | Facility: HOSPITAL | Age: 49
End: 2024-09-03
Attending: PHYSICIAN ASSISTANT
Payer: COMMERCIAL

## 2024-09-03 DIAGNOSIS — E06.3 HYPOTHYROIDISM DUE TO HASHIMOTO'S THYROIDITIS: ICD-10-CM

## 2024-09-03 DIAGNOSIS — E34.8 ENDOCRINE AXIS DYSFUNCTION: ICD-10-CM

## 2024-09-03 LAB
DHEA-S SERPL-MCNC: 175.6 UG/DL
T3FREE SERPL-MCNC: 2.45 PG/ML (ref 2.4–4.2)
T4 FREE SERPL-MCNC: 0.6 NG/DL (ref 0.8–1.7)
TSI SER-ACNC: 2 MIU/ML (ref 0.55–4.78)

## 2024-09-03 PROCEDURE — 84140 ASSAY OF PREGNENOLONE: CPT

## 2024-09-03 PROCEDURE — 84439 ASSAY OF FREE THYROXINE: CPT

## 2024-09-03 PROCEDURE — 36415 COLL VENOUS BLD VENIPUNCTURE: CPT

## 2024-09-03 PROCEDURE — 82627 DEHYDROEPIANDROSTERONE: CPT

## 2024-09-03 PROCEDURE — 84443 ASSAY THYROID STIM HORMONE: CPT

## 2024-09-03 PROCEDURE — 84482 T3 REVERSE: CPT

## 2024-09-03 PROCEDURE — 84481 FREE ASSAY (FT-3): CPT

## 2024-09-05 LAB — REVERSE T3: 10.5 NG/DL

## 2024-09-11 LAB — PREGNENOLONE: 55 NG/DL

## 2024-11-01 ENCOUNTER — OFFICE VISIT (OUTPATIENT)
Dept: PAIN CLINIC | Facility: CLINIC | Age: 49
End: 2024-11-01
Payer: COMMERCIAL

## 2024-11-01 DIAGNOSIS — M25.522 LEFT ELBOW PAIN: Primary | ICD-10-CM

## 2024-11-01 DIAGNOSIS — R68.84 JAW PAIN: ICD-10-CM

## 2024-11-01 DIAGNOSIS — R45.4 ANGER: ICD-10-CM

## 2024-11-01 PROCEDURE — 97811 ACUP 1/> W/O ESTIM EA ADD 15: CPT | Performed by: ACUPUNCTURIST

## 2024-11-01 PROCEDURE — 97810 ACUP 1/> WO ESTIM 1ST 15 MIN: CPT | Performed by: ACUPUNCTURIST

## 2024-11-01 NOTE — PROGRESS NOTES
Janee Mcknight is a 49 year old female No chief complaint on file..     Chief Complaint:    Left elbow pain  Jaw pain  Anxiety      Self reported health status:     Patient reported severity of symptom(s) over the last 24 hours  With zero reporting no symptoms and 10 reporting the worst severity    Symptom 1: 5  Symptom 2: 4  Symptom 3: 5    Response to last TX:  N/A    HPI: Janee is a pleasant 48 y/o presenting with left elbow pain, Jaw pain and emotional stress. Janee commented her elbow pain started hurting after doing weight training. Her Jaw pain she feels comes from high stress and emotional betrayal. She is going through some very difficult times with her partner. She states to feel angry and  worried. Her her anger and worry stems from financial worries and difficulties in her marriage.  She got  late in her life, at 41. Her  left and just \"ghosted\" her. She commented she has been betrayed during her whole marriage. They are trying to work out. She has a history of Hashimoto's  and has experience PTSD.    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    Plan of Care:  Acupuncture Therapy:    Set 1: DENISE-3. LI-4    Set 2: Divehi rico Parker men    Patient Goals: Reduce emotional constrain reduce pain and emotional discomfort.    Face Time: 30 minutes  Total Time: 60 minutes      Treatment performed by Nichole HILL LAc. at [facility].    No follow-ups on file.    Nichole Frank L.AC  11/1/2024  3:41 PM

## 2024-11-04 RX ORDER — LEVOTHYROXINE, LIOTHYRONINE 9.5; 2.25 UG/1; UG/1
15 TABLET ORAL DAILY
Qty: 30 TABLET | Refills: 2 | Status: SHIPPED | OUTPATIENT
Start: 2024-11-04 | End: 2024-11-13

## 2024-11-04 NOTE — TELEPHONE ENCOUNTER
A refill request was received for:  Requested Prescriptions     Pending Prescriptions Disp Refills    THYROID 15 MG Oral Tab [Pharmacy Med Name: THYROID 15 MG TABLET] 30 tablet 2     Sig: TAKE 1 TABLET (15 MG TOTAL) BY MOUTH DAILY.     Last refill date:  8/13/24  Qty: 90 and 0   Dx: hypothyroidism   Last office visit: 8/21/24   When is follow up due: 11/21/24     Future Appointments   Date Time Provider Department Center   11/6/2024  2:30 PM Nichole Frank L.AC [facility] [place]   11/12/2024  3:00 PM Pamela Almeida APRN [id number]   11/13/2024 11:30 AM Bernarda Rivera PA-C [facility] [place]   11/13/2024  2:30 PM Nichole Frank L.AC [facility] [place]   11/20/2024  2:30 PM Nichole Frank L.AC [facility] [place]   11/26/2024 10:30 AM Noah Damian L.AC [facility] [place]

## 2024-11-06 ENCOUNTER — OFFICE VISIT (OUTPATIENT)
Dept: INTEGRATIVE MEDICINE | Facility: CLINIC | Age: 49
End: 2024-11-06
Payer: COMMERCIAL

## 2024-11-06 DIAGNOSIS — F41.9 ANXIETY DISORDER, UNSPECIFIED TYPE: ICD-10-CM

## 2024-11-06 DIAGNOSIS — M25.522 LEFT ELBOW PAIN: Primary | ICD-10-CM

## 2024-11-06 DIAGNOSIS — R68.84 JAW PAIN: ICD-10-CM

## 2024-11-06 DIAGNOSIS — F43.9 STRESS: ICD-10-CM

## 2024-11-06 PROCEDURE — 97810 ACUP 1/> WO ESTIM 1ST 15 MIN: CPT | Performed by: ACUPUNCTURIST

## 2024-11-06 PROCEDURE — 97811 ACUP 1/> W/O ESTIM EA ADD 15: CPT | Performed by: ACUPUNCTURIST

## 2024-11-06 NOTE — PROGRESS NOTES
Janee Mcknight is a 49 year old female No chief complaint on file..     Chief Complaint:    Left elbow pain  Jaw pain  Anxiety        Self reported health status:      Patient reported severity of symptom(s) over the last 24 hours  With zero reporting no symptoms and 10 reporting the worst severity     Symptom 1: 1-2  Symptom 2: 4-5  Symptom 3: 5     Response to last TX:  Her Elbow pain is better with no flare ups of pain. Her jaw pain is still about the same. Anxiety the same level. She is open to trying herbs.     HPI: Janee is a pleasant 48 y/o presenting with left elbow pain, Jaw pain and emotional stress. Janee commented her elbow pain started hurting after doing weight training. Her Jaw pain she feels comes from high stress and emotional stress from betrayal. She is going through some very difficult times with her partner. She states feeling angry and  worried. Her her anger and worry stems from financial worries and difficulties in her marriage.  She  late in life, at 41. Her  left her \"ghosting\" her. She commented she has been betrayed during her whole marriage. Currently, they are trying to work things out. She has a history of Hashimoto's and has experience PTSD.     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     Plan of Care:  Acupuncture Therapy:     Set 1: Bilateral: ST-36, DENISE-3     Set 2: Bilateral: Yin mukherjee, RJ-26, Parker men     Patient Goals: Reduce emotional constrain reduce pain and emotional discomfort.     Face Time: 27 minutes  Total Time:  55 minutes       Treatment performed by Nichole HILL LAc. at [facility].    No follow-ups on file.    Nichole Frank L.AC  11/6/2024  5:04 PM

## 2024-11-13 ENCOUNTER — OFFICE VISIT (OUTPATIENT)
Dept: INTEGRATIVE MEDICINE | Facility: CLINIC | Age: 49
End: 2024-11-13
Payer: COMMERCIAL

## 2024-11-13 ENCOUNTER — TELEMEDICINE (OUTPATIENT)
Dept: INTEGRATIVE MEDICINE | Facility: CLINIC | Age: 49
End: 2024-11-13
Payer: COMMERCIAL

## 2024-11-13 DIAGNOSIS — E06.3 HYPOTHYROIDISM DUE TO HASHIMOTO'S THYROIDITIS: Primary | ICD-10-CM

## 2024-11-13 DIAGNOSIS — R14.0 ABDOMINAL BLOATING: ICD-10-CM

## 2024-11-13 DIAGNOSIS — M25.522 LEFT ELBOW PAIN: Primary | ICD-10-CM

## 2024-11-13 DIAGNOSIS — F41.9 ANXIETY DISORDER, UNSPECIFIED TYPE: ICD-10-CM

## 2024-11-13 DIAGNOSIS — R68.84 JAW PAIN: ICD-10-CM

## 2024-11-13 DIAGNOSIS — E55.9 VITAMIN D DEFICIENCY: ICD-10-CM

## 2024-11-13 DIAGNOSIS — E53.8 LOW SERUM VITAMIN B12: ICD-10-CM

## 2024-11-13 DIAGNOSIS — K58.2 IRRITABLE BOWEL SYNDROME WITH BOTH CONSTIPATION AND DIARRHEA: ICD-10-CM

## 2024-11-13 DIAGNOSIS — R73.03 PREDIABETES: ICD-10-CM

## 2024-11-13 DIAGNOSIS — R63.5 WEIGHT GAIN: ICD-10-CM

## 2024-11-13 DIAGNOSIS — M25.50 MULTIPLE JOINT PAIN: ICD-10-CM

## 2024-11-13 DIAGNOSIS — E61.7 DEFICIENCY OF MULTIPLE NUTRIENT ELEMENTS: ICD-10-CM

## 2024-11-13 DIAGNOSIS — E34.8 ENDOCRINE AXIS DYSFUNCTION: ICD-10-CM

## 2024-11-13 PROCEDURE — 97811 ACUP 1/> W/O ESTIM EA ADD 15: CPT | Performed by: ACUPUNCTURIST

## 2024-11-13 PROCEDURE — 97810 ACUP 1/> WO ESTIM 1ST 15 MIN: CPT | Performed by: ACUPUNCTURIST

## 2024-11-13 RX ORDER — THYROID 30 MG/1
30 TABLET ORAL DAILY
Qty: 90 TABLET | Refills: 0 | Status: SHIPPED | OUTPATIENT
Start: 2024-11-13

## 2024-11-13 NOTE — PROGRESS NOTES
Janee Mcknight is a 49 year old female.  Chief Complaint   Patient presents with    Follow - Up     Lab results        HPI:   48yo pt presents today via video to f/u.   Labs - 9/03/24    Slightly more energy on some days  Still with brain fog and intermittent joint pain  Less bloating  Weight loss resistance - 10-15lbs over her optimal weight despite cutting out all the inflammatory foods  Inquiring about a nutritionist to help her navigate     Will have 3rd acupuncture today - Dr Frank rec herb Jodi Isaacs  Feel as if she is on a lot of supplements    Hypothyroidism with elevated TPO antibodies- TSH in Sept improved, slight improvement in energy. Still with very low FT4 and Free T3    Hormone imbalance -Low adrenal hormone and low progesterone, high normal estradiol, high SHBG.   Reg Menses - Last month period lasted 12days  Feels like she has PMS majority of the month. Swollen breasts, tired, crampy.     IBS - C>D with abdominal bloating.    Slightly better since starting new thyroid medication and eliminating foods that tested high on the KBMO fit test    ------------------------------------Last OV 8/21/24----------------------------------    Walking daily (10-12k steps) and completely changed diet but still feeling heavy, bloated, inflamed, brain fog.  Has energy in the morning and then no motivation as day goes on.  Significant amount of stress recently.  Hurt elbow so has not been doing strength training/weights anymore    Did add the 15mg of NP Thyroid in the afternoon, 60mg in the AM. Not noticed much of a difference.    Keeping up with food eliminations.   BM now more regular  +Bloating less, no longer daily - but admits anxious even when eating    Menses -   Feels like she has PMS majority of the month. Swollen breasts, tired, crampy.     Sleeping ok - taking the FLORES and Mg    --------------------------------------------Last OV------------------------------------    Removed soy, corn, gluten, caffeine,  and dairy since reading Hashimoto's protocol. Then started to remove all the higher reactive foods on the FIT test x3wks.     Fatigue and brain fog and weight loss resistance persists.   GI better.  Sleeping better.     Started the Vit D and FLORES at night x3-4weeks.  Started a wellness journal.     Anxiety (towards end of day - hard to turn brain off), insomnia, brain fog, energy fades in afternoon.   On Wellbutrin and in therapy twice weekly.   H/o depression and anxiety, but worse in the last year after significant stressors. Able to do daily routines but not motivated to go out much.     Hypothyroid - Switched from Levothyroxine (on for over 20yrs) to NP thyroid x 5 weeks ago. PCP had been managing.   ----------------------------------------------------------    Menses mostly regular, perhaps shortening cycles, every 3-4weeks approx  - flow is heavy at times and 1 day of significant cramping. More emotional lately.   Has \"Better hormones\" drink daily that may have chasteberry, ashwagandha, and inositol (250mg) x1yr    Insomnia - Will wake up more frequently and with night terrors.  works night shift x20yrs.    Weight loss resistance - Started berberine x2-3wks to help with blood sugar but has not been taking regularly.    IBS-C>D, bloating - stools have been more regular.   BM consistency changes a lot, but not typically pellets    H/o Factor V Leiden    REVIEW OF SYSTEMS:   Review of Systems     Negative except for pertinent ROS in HPI      FAMILY HISTORY:      Family History   Problem Relation Age of Onset    Hypertension Father     High Cholesterol Father     Breast Cancer Mother 64    Heart Attack Paternal Grandfather     Suicide History Maternal Cousin Male        MEDICAL HISTORY:     Past Medical History:    Allergic rhinitis    Anxiety state, unspecified    Dehydration    Depressive disorder, not elsewhere classified    Diarrhea    Dizziness and giddiness    Eating disorder, unspecified    Factor 5 Leiden  mutation, heterozygous (HCC)    Gastroenteritis    Headache(784.0)    Heartburn    Hematuria    w abdominal pain    Hydronephrosis    mild right and hydrooureter    Hypothyroidism    Hypothyroidism    Lipid screening    Other unknown and unspecified cause of morbidity or mortality    Panic disorder    Paresthesia    Strep pharyngitis    acute    Substance abuse (HCC)    Suicide attempt (HCC)    overdose    Tooth infection    wisdom    Unspecified adjustment reaction    Urgency of urination    Vertigo    chronic    Viral syndrome    Vomiting       CURRENT MEDICATIONS:     Current Outpatient Medications   Medication Sig Dispense Refill    thyroid (NP THYROID) 30 MG Oral Tab Take 1 tablet (30 mg total) by mouth daily. 90 tablet 0    semaglutide-weight management 0.25 MG/0.5ML Subcutaneous Solution Auto-injector Inject 0.5 mL (0.25 mg total) into the skin once a week for 4 doses. 2 mL 0    NP THYROID 60 MG Oral Tab Take 1 tablet (60 mg total) by mouth daily. 90 tablet 0    buPROPion 75 MG Oral Tab Take 1 tablet (75 mg total) by mouth daily. 90 tablet 0    MAGNESIUM OR Take by mouth.      Multiple Vitamin (MULTIVITAMIN ADULT OR) Take by mouth.      PREBIOTIC PRODUCT OR Take by mouth.      Probiotic Product (PROBIOTIC OR) Take by mouth.         SOCIAL HISTORY:   Lifestyle Factors affecting health:    Diet - Trying to Increase fiber to 25-30g and protein.  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   Social History     Socioeconomic History    Marital status:    Tobacco Use    Smoking status: Never    Smokeless tobacco: Never   Vaping Use    Vaping status: Never Used   Substance and Sexual Activity    Alcohol use: Yes     Comment: social, has not had since the end of March 2023     Drug use: No   Other Topics Concern    Caffeine Concern Yes    Exercise Yes    Seat Belt Yes       SURGICAL HISTORY:     Past Surgical History:   Procedure Laterality Date    Colposcopy,loop electrd cervix excis N/A 1/25/2016    Procedure: LOOP ELECTRICAL EXCISION CERVIX,COLPOSCOPY;  Surgeon: David Rodriguez MD;  Location: [facility]    Hysteroscopy,with sampling N/A 10/21/2015    Procedure: HYSTEROSCOPY W/DILATION AND CURETTAGE;  Surgeon: David Rodriguez MD;  Location: [facility]       PHYSICAL EXAM:     There were no vitals filed for this visit.      Physical Exam  Constitutional:       General: She is not in acute distress.     Appearance: Normal appearance. She is not ill-appearing or toxic-appearing.   HENT:      Head: Normocephalic and atraumatic.   Eyes:      Extraocular Movements: Extraocular movements intact.   Pulmonary:      Effort: Pulmonary effort is normal. No respiratory distress.   Musculoskeletal:      Cervical back: Normal range of motion.   Skin:     Coloration: Skin is not jaundiced.      Findings: No lesion.   Neurological:      Mental Status: She is alert and oriented to person, place, and time.   Psychiatric:         Mood and Affect: Mood normal.         Behavior: Behavior normal.         Thought Content: Thought content normal.         Judgment: Judgment normal.          ASSESSMENT AND PLAN:     [facility] Lab Encounter on 09/03/2024   Component Date Value Ref Range Status    DHEA Sulfate 09/03/2024 175.6  25.9 - 460.2 ug/dL Final    This test may exhibit interference of greater than 10% when a sample is collected from a person who is consuming high dose of biotin (a.k.a., vitamin B7, vitamin H, coenzyme R) supplements resulting in serum concentrations &gt;12.5 ng/mL, leading to either falsely elevated or falsely depressed results.  Intake of the recommended daily allowance (RDA) for biotin (0.03 mg) has not been shown to typically cause significant interference; however, high  dose daily dietary supplements may contain biotin concentrations greater than 150 times (5-10 mg) the RDA.  It is recommended that physicians ask all patients who may be on biotin supplementation to stop biotin consumption at least 72 hours prior to collection of a new sample.    Pregnenolone 09/03/2024 55  ng/dL Final    This test was developed and its performance characteristics  determined by [facility]. It has not been cleared or approved  by the Food and Drug Administration.  Reference Range:  Adults: <151    Free T4 09/03/2024 0.6 (L)  0.8 - 1.7 ng/dL Final    TSH 09/03/2024 2.003  0.550 - 4.780 mIU/mL Final    T3 Free 09/03/2024 2.45  2.40 - 4.20 pg/mL Final    Reverse T3 09/03/2024 10.5  9.2 - 24.1 ng/dL Final   [facility] Lab Encounter on 07/17/2024   Component Date Value Ref Range Status    TSH 07/17/2024 7.203 (H)  0.550 - 4.780 mIU/mL Final    T3 Free 07/17/2024 2.62  2.40 - 4.20 pg/mL Final    Free T4 07/17/2024 0.8  0.8 - 1.7 ng/dL Final    Reverse T3 07/17/2024 8.3 (L)  9.2 - 24.1 ng/dL Final    Anti-Thyroglobulin 07/17/2024 <15  <60 U/mL Final    Anti-Thyroperoxidase 07/17/2024 103 (H)  <60 U/mL Final   [facility] Lab Encounter on 05/17/2024   Component Date Value Ref Range Status    Zinc, RBC 05/17/2024 1510  878 - 1660 ug/dL Final    This test was developed and its performance characteristics  determined by [facility]. It has not been cleared or approved  by the Food and Drug Administration.   Telemedicine on 05/14/2024   Component Date Value Ref Range Status    Vitamin B12 05/17/2024 386  193 - 986 pg/mL Final    Vitamin B12 Reference Range      Deficient:      <150 pg/mL      Indeterminate   150-192 pg/mL      Normal:         193-986 pg/mL      This test may exhibit interference when a sample is collected from a person who is consuming high dose of biotin (a.k.a., vitamin B7, vitamin H, coenzyme R) supplements resulting in serum concentrations >100 ng/mL.  Intake of the recommended daily allowance (RDA) for biotin  (0.03 mg) has not been shown to typically cause significant interference; however, high dose daily dietary supplements may contain biotin concentrations greater than 150 times (5-10 mg) the RDA.  It is recommended that physicians ask all patients who may be on biotin supplementation to stop biotin consumption at least 72 hours prior to collection of a new sample.      Homocysteine 05/17/2024 7.7  3.2 - 10.7 umol/L Final      No results found.    1. Hypothyroidism due to Hashimoto's thyroiditis  - Assay, Thyroid Stim Hormone; Future  - Free T4, (Free Thyroxine); Future  - Free T3 (Triiodothryronine); Future  - Reverse T3, Serum; Future    2. Irritable bowel syndrome with both constipation and diarrhea    3. Abdominal bloating    4. Multiple joint pain    5. Anxiety disorder, unspecified type    6. Endocrine axis dysfunction    7. Vitamin D deficiency  - Vitamin D; Future    8. Low serum vitamin B12  - Vitamin B12; Future    9. Deficiency of multiple nutrient elements    10. Weight gain    11. Prediabetes  - Hemoglobin A1C; Future          This visit was conducted using Telemedicine with live, interactive video and audio.   The patient understands the risks and benefits of Telemedicine and that a Telemedicine visit limits the ability to perform a thorough physical examination which may affect objective findings related to specific symptoms and conditions which can, in turn, affect treatment.     The patient was located in the [facility] at the time of the encounter.      Time spent with patient: Over 45 minutes spent in chart review and in direct communication with patient obtaining and reviewing history, creating a unique care plan, explaining the rationale for treatment, reviewing potential SE and overall treatment plan,  documenting all clinical information in Epic. Over 50% of this time was in education, counseling and coordination of care.     Problem List Items Addressed This Visit          Endocrine and  Metabolic    Vitamin D deficiency    Relevant Medications    thyroid (NP THYROID) 30 MG Oral Tab    semaglutide-weight management 0.25 MG/0.5ML Subcutaneous Solution Auto-injector    Other Relevant Orders    Vitamin D       Mental Health    Anxiety disorder     Other Visit Diagnoses       Hypothyroidism due to Hashimoto's thyroiditis    -  Primary    Relevant Medications    thyroid (NP THYROID) 30 MG Oral Tab    Other Relevant Orders    Assay, Thyroid Stim Hormone    Free T4, (Free Thyroxine)    Free T3 (Triiodothryronine)    Reverse T3, Serum    Irritable bowel syndrome with both constipation and diarrhea        Abdominal bloating        Multiple joint pain        Endocrine axis dysfunction        Relevant Medications    thyroid (NP THYROID) 30 MG Oral Tab    semaglutide-weight management 0.25 MG/0.5ML Subcutaneous Solution Auto-injector    Low serum vitamin B12        Relevant Medications    thyroid (NP THYROID) 30 MG Oral Tab    semaglutide-weight management 0.25 MG/0.5ML Subcutaneous Solution Auto-injector    Other Relevant Orders    Vitamin B12    Deficiency of multiple nutrient elements        Relevant Medications    thyroid (NP THYROID) 30 MG Oral Tab    semaglutide-weight management 0.25 MG/0.5ML Subcutaneous Solution Auto-injector    Weight gain        Relevant Medications    thyroid (NP THYROID) 30 MG Oral Tab    semaglutide-weight management 0.25 MG/0.5ML Subcutaneous Solution Auto-injector    Prediabetes        Relevant Medications    semaglutide-weight management 0.25 MG/0.5ML Subcutaneous Solution Auto-injector    Other Relevant Orders    Hemoglobin A1C                 Endocrine:  Hypothyroidism with elevated TPO antibodies-has been on NP thyroid since 4/3/24, but has not reached a therapeutic level symptomatically or in the labs yet.  -> Check labs in 4 weeks after increasing by 15 mcg     Hormone imbalance -low DHEA/progesterone, high normal estradiol, high SHBG.   -> Recheck adrenal hormones   ->  cont w/acupuncture  -> Replace Ashwagandha with nu adapt, BID    Weight Loss Resistance despite removal of all inflammatory foods, regular exercise.  Thyroid still not at therapeutic level.  Discussed alternative options such as a GLP-1 to help reduce prediabetic level of Hg A1c, reduce weight gain, and benefit from the anti-inflammatory properties.  Patient would like to proceed and we will first push through to the pharmacy to check for coverage.  If none, will send to compounding pharmacy.  Patient reports no personal or family history of MEN or thyroid cancers.    GI:  IBS - C>D with abdominal bloating.    Slightly better since starting new thyroid medication and eliminating foods that tested high on the KBMO fit test (see download).    [4+ reaction to casein, wheat, cranberry, cinnamon, garlic, pine nut.  3+ reaction to cows milk, whey, eggs, wheat, and Azael seed.]      Cardiometabolic:  Prediabetes-- hemoglobin A1c at 5.7.  -Started inositol with selenium to help with blood sugar regulation and anxiety  -> Recheck hemoglobin A1c in 4 weeks  -> Start semaglutide    HPA Axis: Increased stress, anxiety and depression. Working with a therapist and on meds.   -DHEA low,  supplementing 5 mg every morning    Insomnia - improved    Given further recommendations as below    Orders Placed This Visit:  Orders Placed This Encounter   Procedures    Hemoglobin A1C    Assay, Thyroid Stim Hormone    Free T4, (Free Thyroxine)    Free T3 (Triiodothryronine)    Reverse T3, Serum    Vitamin D    Vitamin B12     No orders of the defined types were placed in this encounter.      Patient Instructions   Nutritionist:  Gail Francisco  [phone]  [place]   ---------------------------------------------------------------------------  Possible PCP options:     Li Espinoza DO, or any of the practitioners in her office;  [address], [place] [address] (On license of [facility])     Tayla Conley         [address]        [place], [place]         [phone]      3.  Dr. Tayla Machado       [address]        [place], [place]       [phone]     4. Dr. Sharron Valadez       [address]       [place], [place]      5. Dr. Twyla York       [address]       [place] [address]       [phone]    6. Cleopatra Bal M.D.      [facility]      [address] [place] [address]    7. Teja Jiang DO (he is in the city, but IS Integrative/Functional under the [email or web address] system)  [address] [place] [address]  -----------------------------------------------------------------------------------    GLP-1 (WEGOVY) PROTOCOL    1) Prioritize protein and vegetable intake, fresh fruits. May find it helpful to add a protein powder (+/- Greens) to supplement a meal if not very hungry.     2) If constipation occurs, start Magnesium Citrate (CALM a good option as it can be titrated up/down until you have smooth and daily bowel movements).    3) First month the medication will be taken at the lowest dose (0.25mg), once weekly x4wks.   If tolerated, increase the dose the second month to 0.5mg x4wks.   If tolerated, increase the dose the third month to 1mg.  Please send a message/call nurse at our office to request next month's dose be sent to the pharmacy - please allow a few days for the RX to be called in.    5) Follow up in 10wks to assess progress and dosing moving forward.     6) If not eating 3 meals due to decreased hunger, consider adding a Multivitamin to ensure adequate vitamin and nutrient status.     7) Maintain a good exercise program with weight resistance, of at least 20-30min/day.     ------------------------------------------------------------------------------------  May discontinue Berberine, NAC, Selenium, NAD at this time       No follow-ups on file.    Patient affirmed understanding of plan and all questions were answered.     Bernarda Rivera PA-C

## 2024-11-13 NOTE — PATIENT INSTRUCTIONS
Nutritionist:  Gail Francisco  [phone]  [employer]   ---------------------------------------------------------------------------  Possible PCP options:     Li Espinoza DO, or any of the practitioners in her office;  [address] [place] [address] [address], [place] [address] ([facility])     Tayla Conley         [address]        [place], [place]         [phone]      3.  Dr. Tayla Machado       [address]        [place], [place]       [phone]     4. Dr. Sharron Valadez       [address]       [place], [place]      5. Dr. Twyla York       [address]       [place] [address]       [phone]    6. Cleopatra Bal M.D.      [facility]      [address] [place] [address]    7. Teja Jiang DO (he is in the city, but IS Integrative/Functional under the [employer] system)  [address] [place] [address]  -----------------------------------------------------------------------------------    GLP-1 (WEGOVY) PROTOCOL    1) Prioritize protein and vegetable intake, fresh fruits. May find it helpful to add a protein powder (+/- Greens) to supplement a meal if not very hungry.     2) If constipation occurs, start Magnesium Citrate (CALM a good option as it can be titrated up/down until you have smooth and daily bowel movements).    3) First month the medication will be taken at the lowest dose (0.25mg), once weekly x4wks.   If tolerated, increase the dose the second month to 0.5mg x4wks.   If tolerated, increase the dose the third month to 1mg.  Please send a message/call nurse at our office to request next month's dose be sent to the pharmacy - please allow a few days for the RX to be called in.    5) Follow up in 10wks to assess progress and dosing moving forward.     6) If not eating 3 meals due to decreased hunger, consider adding a Multivitamin to ensure adequate vitamin and nutrient status.     7) Maintain a good exercise program with weight resistance,  of at least 20-30min/day.     ------------------------------------------------------------------------------------  May discontinue Berberine, NAC, Selenium, NAD at this time

## 2024-11-14 NOTE — PROGRESS NOTES
Janee Mcknight is a 49 year old female No chief complaint on file..     Chief Complaint:     Left elbow pain  Jaw pain  Anxiety        Self reported health status:      Patient reported severity of symptom(s) over the last 24 hours  With zero reporting no symptoms and 10 reporting the worst severity     Symptom 1: 1-2  Symptom 2: 4-5  Symptom 3: 6     Response to last TX:  Her elbow pain is better with minor flare ups. Her jaw pain is still about the same. Anxiety the same level. She and her partner continue to work on their marriage.     Her energy has slightly improved she  commented she is not sure what is helping because she is on so many supplements. After reviewing supplements with Bernarda Rivrea She and I agreed to reduce the number of supplements and base line her by keeping her on DHEA 5g and her thyroid medication.  Supplements removed include NAC, NAD, berberine , julio. Since Bernarda increased her thyroid medicine selenium was removed. All the supplements will eventually be added back.     Her anxiety is bad and is one of main concerns. Will introduce Jodi Amos.     HPI: Janee is a pleasant 48 y/o presenting with left elbow pain, Jaw pain and emotional stress. Janee commented her elbow pain started hurting after doing weight training. Her Jaw pain she feels comes from high stress and emotional stress from betrayal. She is going through some very difficult times with her partner. She states feeling angry and  worried. Her her anger and worry stems from financial worries and difficulties in her marriage.  She  late in life, at 41. Her  left her \"ghosting\" her. She commented she has been betrayed during her whole marriage. Currently, they are trying to work things out. She has a history of Hashimoto's and has experience PTSD.     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     Plan of Care:  Acupuncture Therapy:     Set 1: Bilateral: DENISE-3, KID-3, SP-6, ST-36, Yin mukherjee, Parker men    Note: Will be starting Joid Latrell Amos     Patient Goals: Reduce emotional constrain reduce pain and emotional discomfort, reduce symptoms of hashimoto.      Face Time: 27 minutes  Total Time:  55 minutes  Treatment performed by Nichole HILL LAc. at [facility].    No follow-ups on file.    Nichole Frank L.AC  11/14/2024  1:30 PM

## 2024-11-18 ENCOUNTER — TELEPHONE (OUTPATIENT)
Dept: INTEGRATIVE MEDICINE | Facility: CLINIC | Age: 49
End: 2024-11-18

## 2024-11-18 ENCOUNTER — OFFICE VISIT (OUTPATIENT)
Dept: INTEGRATIVE MEDICINE | Facility: CLINIC | Age: 49
End: 2024-11-18
Payer: COMMERCIAL

## 2024-11-18 DIAGNOSIS — R68.84 JAW PAIN: Primary | ICD-10-CM

## 2024-11-18 DIAGNOSIS — M25.522 LEFT ELBOW PAIN: ICD-10-CM

## 2024-11-18 PROCEDURE — 97810 ACUP 1/> WO ESTIM 1ST 15 MIN: CPT | Performed by: ACUPUNCTURIST

## 2024-11-18 PROCEDURE — 97811 ACUP 1/> W/O ESTIM EA ADD 15: CPT | Performed by: ACUPUNCTURIST

## 2024-11-18 NOTE — TELEPHONE ENCOUNTER
Patient stated she received a message from the pharmacy that one of her medications requires a prior authorization.    Please advise, Thank you

## 2024-11-18 NOTE — PROGRESS NOTES
Janee Mcknight is a 49 year old female Acupuncture Therapy.   Chief Complaint: Jaw pain  Secondary Complaint: LT elbow pain  Tertiary Complaint: anxiety    Self reported health status:     Patient reported severity of symptom(s) over the last 24 hours  With zero reporting no symptoms and 10 reporting the worst severity    Symptom 1: 3  Symptom 2: 1-4  Symptom 3: 5    Response to last TX: Janee has been seeing DR. Frank for several tx. She felt more relaxed after the last tx, but has yet to notice a significant change.    HPI: Janee suffers from bilateral jaw pain which is exacerbated by stress.  Additionally, she injured her LT elbow several weeks earlier and still has weakness with some movement and discomfort.    Emotionally, she can feel numb but that is improving. Her stress is high and tends toward anxiety.  Has been struggling with her relationship with her .  Works as an RN in pediatrics.    Menses are irregular as she has cramps and some moodiness.  Has been shorter recently.    Does have a hx of PTSD and Hashimoto's thyroiditis.  She sees LELAND Rivera and recently starting reducing supplements.  Will start donnie benigno wan today.      Objective (Pulse, Tongue, Vitals): /72.  Tongue red at tip with SLV distention. LT elbow is sore medial and deep with palpation.    TCM Diagnosis: Channel obstruction with LR josé    Plan of Care: Acupuncture Therapy  1st set: Bilateral cardenas men, yin mukherjee  2nd set: LT: LI 4, SJ 5, LR 8, SP 9, 6, 4, LR 3, RT: PC 6, HT 5, LG 7, ST 40, GB 41    Patient Goals: Balance mood and anxiety, resolve elbow and jaw pain    Face Time: 35 minutes  Total Time: 60 minutes      Treatment performed by Noah HILL LAc. at [facility].    No follow-ups on file.    Noah Damian L.AC  11/18/2024  5:43 PM

## 2024-11-25 ENCOUNTER — PATIENT MESSAGE (OUTPATIENT)
Dept: FAMILY MEDICINE CLINIC | Facility: CLINIC | Age: 49
End: 2024-11-25

## 2024-11-25 ENCOUNTER — TELEPHONE (OUTPATIENT)
Dept: INTEGRATIVE MEDICINE | Facility: CLINIC | Age: 49
End: 2024-11-25

## 2024-11-25 NOTE — TELEPHONE ENCOUNTER
Pt requested to speak with a nurse regarding pre authorization for Semaglutide.  
See other encounter  
none

## 2024-11-25 NOTE — TELEPHONE ENCOUNTER
RN s/w patient and informed her of the PA process and what to expect. Informed pt that she will be notified once her insurance sends their response. Pt verbalized understanding.

## 2024-11-27 ENCOUNTER — OFFICE VISIT (OUTPATIENT)
Dept: PAIN CLINIC | Facility: CLINIC | Age: 49
End: 2024-11-27
Payer: COMMERCIAL

## 2024-11-27 DIAGNOSIS — R68.84 JAW PAIN: Primary | ICD-10-CM

## 2024-11-27 PROCEDURE — 97813 ACUP 1/> W/ESTIM 1ST 15 MIN: CPT | Performed by: ACUPUNCTURIST

## 2024-11-27 PROCEDURE — 97814 ACUP 1/> W/ESTIM EA ADDL 15: CPT | Performed by: ACUPUNCTURIST
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
## 2024-11-27 NOTE — PATIENT INSTRUCTIONS
Herbal medicine instructions:    Take 4 pills 2 times per day of the donnie pelaez wan.  You can take one dose in the morning and one in the evening. If taking other medications, space the herbs and the medications by 2 hours.

## 2024-12-02 ENCOUNTER — OFFICE VISIT (OUTPATIENT)
Dept: INTEGRATIVE MEDICINE | Facility: CLINIC | Age: 49
End: 2024-12-02
Payer: COMMERCIAL

## 2024-12-02 DIAGNOSIS — R68.84 JAW PAIN: Primary | ICD-10-CM

## 2024-12-02 PROCEDURE — 97810 ACUP 1/> WO ESTIM 1ST 15 MIN: CPT | Performed by: ACUPUNCTURIST

## 2024-12-02 PROCEDURE — 97811 ACUP 1/> W/O ESTIM EA ADD 15: CPT | Performed by: ACUPUNCTURIST

## 2024-12-02 NOTE — PATIENT INSTRUCTIONS
Increase Herbal medicine dosage to 5 pills 2 times per day.    Practice qi gong movements as demonstrated during appointment.

## 2024-12-02 NOTE — PROGRESS NOTES
Janee Mcknight is a 49 year old female Acupuncture Therapy.   Chief Complaint: Jaw pain  Secondary Complaint: LT elbow pain  Tertiary Complaint: Anxiety    Self reported health status:     Patient reported severity of symptom(s) over the last 24 hours  With zero reporting no symptoms and 10 reporting the worst severity    Symptom 1: 3  Symptom 2: 1-2  Symptom 3: 5    Response to last TX: Janee did not notice a significant change in her jaw pain or anxiety after the last tx, but feels there may have been some mild changes. However, her stress and anxiety over the past week has been excessive. We discussed the importance of finding activities to reduce her stress and anxiety which come mostly from family.    Qi gong movements were demonstrated today.    Elbow pain was not highlighted as a complaint today.    Will continue with Donnie benigno wan 5BID instead of 4.    HPI 11/14/24: Janee suffers from bilateral jaw pain which is exacerbated by stress.  Additionally, she injured her LT elbow several weeks earlier and still has weakness with some movement and discomfort.    Emotionally, she can feel numb but that is improving. Her stress is high and tends toward anxiety.  Has been working through some relationship concerns.  Works as an RN in pediatrics.    Menses are irregular as she has cramps and some moodiness.  Has been shorter recently.    Does have a hx of PTSD and Hashimoto's thyroiditis.  She sees LELAND Rivera and recently starting reducing supplements.  Will start donnie benigno wan today.      Objective (Pulse, Tongue, Vitals): /72.  Tongue red at tip with SLV distention. LT elbow is sore medial and deep with palpation.    TCM Diagnosis: Channel obstruction with LR josé    Plan of Care: Acupuncture Therapy  1st set: Bilateral cardenas men, yin mukherjee, GB 21  2nd set: LT: LI 4, SJ 5, LR 8, SP 9, 6, 4, LR 3, RT: PC 6, HT 5, LG 7, ST 40, GB 41    Donnie benigno wan 4 BID    Patient Goals: Balance mood and anxiety, resolve elbow and jaw  pain    Face Time: 25 minutes  Total Time: 50 minutes      Treatment performed by Noah HILL LAc. at [facility].    No follow-ups on file.    Noah Damian L.AC  11/18/2024  5:43 PM

## 2024-12-07 ENCOUNTER — LAB ENCOUNTER (OUTPATIENT)
Dept: LAB | Facility: HOSPITAL | Age: 49
End: 2024-12-07
Attending: PHYSICIAN ASSISTANT
Payer: COMMERCIAL

## 2024-12-07 DIAGNOSIS — R73.03 PREDIABETES: ICD-10-CM

## 2024-12-07 DIAGNOSIS — E55.9 VITAMIN D DEFICIENCY: ICD-10-CM

## 2024-12-07 DIAGNOSIS — E53.8 LOW SERUM VITAMIN B12: ICD-10-CM

## 2024-12-07 DIAGNOSIS — E06.3 HYPOTHYROIDISM DUE TO HASHIMOTO'S THYROIDITIS: ICD-10-CM

## 2024-12-07 LAB
EST. AVERAGE GLUCOSE BLD GHB EST-MCNC: 111 MG/DL (ref 68–126)
HBA1C MFR BLD: 5.5 % (ref ?–5.7)
T3FREE SERPL-MCNC: 3.24 PG/ML (ref 2.4–4.2)
T4 FREE SERPL-MCNC: 0.9 NG/DL (ref 0.8–1.7)
TSI SER-ACNC: 0.46 UIU/ML (ref 0.55–4.78)
VIT B12 SERPL-MCNC: 868 PG/ML (ref 211–911)
VIT D+METAB SERPL-MCNC: 66.8 NG/ML (ref 30–100)

## 2024-12-07 PROCEDURE — 36415 COLL VENOUS BLD VENIPUNCTURE: CPT

## 2024-12-07 PROCEDURE — 82607 VITAMIN B-12: CPT

## 2024-12-07 PROCEDURE — 82306 VITAMIN D 25 HYDROXY: CPT

## 2024-12-07 PROCEDURE — 83036 HEMOGLOBIN GLYCOSYLATED A1C: CPT

## 2024-12-07 PROCEDURE — 84443 ASSAY THYROID STIM HORMONE: CPT

## 2024-12-07 PROCEDURE — 84439 ASSAY OF FREE THYROXINE: CPT

## 2024-12-07 PROCEDURE — 84481 FREE ASSAY (FT-3): CPT

## 2024-12-07 PROCEDURE — 84482 T3 REVERSE: CPT

## 2024-12-11 LAB — REVERSE T3: 9.8 NG/DL

## 2024-12-18 RX ORDER — THYROID 30 MG/1
30 TABLET ORAL DAILY
Qty: 90 TABLET | Refills: 0 | OUTPATIENT
Start: 2024-12-18

## 2024-12-18 RX ORDER — LEVOTHYROXINE, LIOTHYRONINE 38; 9 UG/1; UG/1
60 TABLET ORAL DAILY
Qty: 90 TABLET | Refills: 0 | OUTPATIENT
Start: 2024-12-18

## 2024-12-19 ENCOUNTER — TELEPHONE (OUTPATIENT)
Dept: INTEGRATIVE MEDICINE | Facility: CLINIC | Age: 49
End: 2024-12-19

## 2024-12-19 NOTE — TELEPHONE ENCOUNTER
Requesting refill of NP thyroid 60 mg (she recently only received the 30mg). She has approximately x2 days left. Requesting call back to discuss, she stated she would require refill today.    Thank you

## 2024-12-19 NOTE — TELEPHONE ENCOUNTER
A refill request was received for:  Requested Prescriptions     Pending Prescriptions Disp Refills    NP THYROID 60 MG Oral Tab 90 tablet 0     Sig: Take 1 tablet (60 mg total) by mouth daily.     Refused Prescriptions Disp Refills    NP THYROID 60 MG Oral Tab 90 tablet 0     Sig: Take 1 tablet (60 mg total) by mouth daily.     Refused By: USHA BRIGGS     Reason for Refusal: Patient has requested refill too soon     Last refill date:  9/17/2024  Qty: 90 tablets and 0  Dx: hypothyroid  Last office visit: 11/13/2024  When is follow up due: 10 weeks      Future Appointments   Date Time Provider Department Center   12/31/2024 10:30 AM Nichole Frank L.AC [facility] [place]   1/14/2025  2:00 PM Daisy Machado DO [facility]   2/17/2025  3:00 PM Pamela Almeida APRN [id number]   3/18/2025  3:30 PM Bernarda Rivera, PAViancaC [facility] [place]

## 2024-12-20 RX ORDER — LEVOTHYROXINE, LIOTHYRONINE 38; 9 UG/1; UG/1
60 TABLET ORAL DAILY
Qty: 90 TABLET | Refills: 0 | Status: SHIPPED | OUTPATIENT
Start: 2024-12-20

## 2024-12-31 ENCOUNTER — OFFICE VISIT (OUTPATIENT)
Dept: INTEGRATIVE MEDICINE | Facility: CLINIC | Age: 49
End: 2024-12-31
Payer: COMMERCIAL

## 2024-12-31 DIAGNOSIS — F41.9 ANXIETY DISORDER, UNSPECIFIED TYPE: ICD-10-CM

## 2024-12-31 DIAGNOSIS — R68.84 JAW PAIN: Primary | ICD-10-CM

## 2024-12-31 DIAGNOSIS — M25.522 LEFT ELBOW PAIN: ICD-10-CM

## 2024-12-31 DIAGNOSIS — F43.9 STRESS: ICD-10-CM

## 2024-12-31 PROCEDURE — 97811 ACUP 1/> W/O ESTIM EA ADD 15: CPT | Performed by: ACUPUNCTURIST

## 2024-12-31 PROCEDURE — 97810 ACUP 1/> WO ESTIM 1ST 15 MIN: CPT | Performed by: ACUPUNCTURIST
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
## 2025-02-10 ENCOUNTER — OFFICE VISIT (OUTPATIENT)
Dept: SURGERY | Facility: CLINIC | Age: 50
End: 2025-02-10
Payer: COMMERCIAL

## 2025-02-10 VITALS
SYSTOLIC BLOOD PRESSURE: 106 MMHG | HEIGHT: 65 IN | DIASTOLIC BLOOD PRESSURE: 58 MMHG | BODY MASS INDEX: 21.16 KG/M2 | WEIGHT: 127 LBS

## 2025-02-10 DIAGNOSIS — N95.1 PERIMENOPAUSAL SYMPTOMS: ICD-10-CM

## 2025-02-10 DIAGNOSIS — Z12.31 BREAST CANCER SCREENING BY MAMMOGRAM: Primary | ICD-10-CM

## 2025-02-10 DIAGNOSIS — F33.9 RECURRENT DEPRESSION: ICD-10-CM

## 2025-02-10 PROBLEM — Z01.419 WOMEN'S ANNUAL ROUTINE GYNECOLOGICAL EXAMINATION: Status: ACTIVE | Noted: 2025-02-10

## 2025-02-10 RX ORDER — BUPROPION HYDROCHLORIDE 150 MG/1
150 TABLET, EXTENDED RELEASE ORAL DAILY
Qty: 90 TABLET | Refills: 3 | Status: SHIPPED | OUTPATIENT
Start: 2025-02-10 | End: 2026-02-05

## 2025-02-10 NOTE — PROGRESS NOTES
NEW GYN H&P     2/10/2025  11:37 AM    Chief Complaint   Patient presents with    New Patient     New pt here to establish care and discuss perimenopausal symptoms        HPI: Patient is a 49 year old  LMP 25 here to establish care and consult about perimenopause.  Due for AGYN but still on menses today. History significant for longstanding recurrent and sometimes severe depression currently on low dose Bupropion and followed by Psych APRN as well as our Integrative Medicine and Acupuncture clinics. Discussed and answered questions about perimenopause and worsening brain fog, decreased libido and depressive symptoms. Discussed hormonal fluctuations impact on stress, mood and libido and counseled on trial with non hormonal options of Ashwawgandha, L- Arginine, and consultation for Reiki services with plan for returning to have AGYN exam once cycle has completed.    Patient's last menstrual period was 2025 (approximate).    OB History    Para Term  AB Living   1 1 1 0 0 1   SAB IAB Ectopic Multiple Live Births   0 0 0 1 1      # Outcome Date GA Lbr Jeremie/2nd Weight Sex Type Anes PTL Lv   1 Term 97   9 lb 2 oz (4.139 kg) M NORMAL SPONT   DENISE       GYN hx:    Hx Prior Abnormal Pap: No  Pap Date: 20  Pap Result Notes: wnl/-hpv  Follow Up Recommendation: Last Mammo: 2023  CONTRACEPTION: None  LAST MAMMOGRAM: 2023      Current Outpatient Medications   Medication Sig Dispense Refill    NP THYROID 60 MG Oral Tab Take 1 tablet (60 mg total) by mouth daily. 90 tablet 0    CUSTOM MEDICATION Semaglutide 2.5 mg/mL  Dispense: 2 mL vial  Sig: Inject 0.1 mL (0.25 mg) subcutaneously once weekly x 2-3 weeks, then        Inject 0.2 mL (0.5 mg) subcutaneously once weekly X 3 weeks, then if tolerated        Inject 0.4 mL (1 mg) subcutaneously once weekly 1 each 0    buPROPion 75 MG Oral Tab Take 1 tablet (75 mg total) by mouth daily. 90 tablet 0    thyroid (NP THYROID) 30 MG Oral Tab  Take 1 tablet (30 mg total) by mouth daily. 90 tablet 0    MAGNESIUM OR Take by mouth.      Multiple Vitamin (MULTIVITAMIN ADULT OR) Take by mouth.      PREBIOTIC PRODUCT OR Take by mouth. (Patient not taking: Reported on 2/10/2025)      Probiotic Product (PROBIOTIC OR) Take by mouth. (Patient not taking: Reported on 2/10/2025)         Past Medical History:    Allergic rhinitis    Anxiety state, unspecified    Blood disorder    Dehydration    Depression    Depressive disorder, not elsewhere classified    Diarrhea    Dizziness and giddiness    Dysmenorrhea    Eating disorder, unspecified    Factor 5 Leiden mutation, heterozygous (HCC)    Gastroenteritis    Headache(784.0)    Heartburn    Hematuria    w abdominal pain    Hormone disorder    Human papilloma virus infection    Hydronephrosis    mild right and hydrooureter    Hypothyroidism    Hypothyroidism    Lipid screening    Other unknown and unspecified cause of morbidity or mortality    Panic disorder    Paresthesia    Strep pharyngitis    acute    Substance abuse (HCC)    Suicide attempt ([facility])    overdose    Tooth infection    wisdom    Unspecified adjustment reaction    Urgency of urination    Vertigo    chronic    Viral syndrome    Vomiting     Past Surgical History:   Procedure Laterality Date    Colposcopy, cervix w/upper adjacent vagina; w/biopsy(s), cervix      Colposcopy,loop electrd cervix excis N/A 01/25/2016    Procedure: LOOP ELECTRICAL EXCISION CERVIX,COLPOSCOPY;  Surgeon: David Rodriguez MD;  Location: [facility]    Hysteroscopy,with sampling N/A 10/21/2015    Procedure: HYSTEROSCOPY W/DILATION AND CURETTAGE;  Surgeon: David Rodriguez MD;  Location: [facility]    Leep       Allergies[1]  Family History   Problem Relation Age of Onset    Hypertension Father     High Cholesterol Father     Heart Disorder Father     Breast Cancer Mother 64    Cancer Mother     Heart Disorder Mother     Heart Attack Paternal Grandfather     Suicide  History Maternal Cousin Male     Ovarian Cancer Neg     Uterine Cancer Neg     Colon Cancer Neg      Social History     Socioeconomic History    Marital status:    Occupational History    Occupation: Peds Nurse   Tobacco Use    Smoking status: Never    Smokeless tobacco: Never   Vaping Use    Vaping status: Never Used   Substance and Sexual Activity    Alcohol use: Not Currently     Comment: social, has not had since the end of March 2023    Drug use: No    Sexual activity: Yes     Partners: Male     Birth control/protection: Vasectomy     Social History     Social History Narrative    Not on file       ROS:     Review of Systems:  A comprehensive 10 point ROS was completed. All pertinent positives and negatives noted in the HPI.     /58   Ht 65\"   Wt 127 lb (57.6 kg)   LMP 02/08/2025 (Approximate)   BMI 21.13 kg/m²       A/P: Patient is 49 year old female     1. Perimenopausal symptoms  - Start Ashwagandha 200-300 mg 2x/d  - Start L-Arginine 2.5 - 5.0 mg daily  - Return for AGYN once menses complete    2. Recurrent depression  - Increase Bupropion to 150 mg/d  - Referral placed for Reik    3. Breast cancer screening by mammogram  - Mammogram ordered      Total time spent = 30 minutes  >50% visit = face to face counseling and coordination of care        2/10/2025  Cleopatra Mcgarry MD                    [1]   Allergies  Allergen Reactions    Penicillins HIVES    Lamictal [Lamotrigine] RASH

## 2025-02-11 ENCOUNTER — OFFICE VISIT (OUTPATIENT)
Dept: FAMILY MEDICINE CLINIC | Facility: CLINIC | Age: 50
End: 2025-02-11
Payer: COMMERCIAL

## 2025-02-11 VITALS
WEIGHT: 128 LBS | TEMPERATURE: 97 F | DIASTOLIC BLOOD PRESSURE: 60 MMHG | HEIGHT: 65 IN | BODY MASS INDEX: 21.33 KG/M2 | SYSTOLIC BLOOD PRESSURE: 102 MMHG | HEART RATE: 80 BPM | OXYGEN SATURATION: 97 % | RESPIRATION RATE: 16 BRPM

## 2025-02-11 DIAGNOSIS — Z00.00 ENCOUNTER FOR GENERAL ADULT MEDICAL EXAMINATION WITHOUT ABNORMAL FINDINGS: Primary | ICD-10-CM

## 2025-02-11 DIAGNOSIS — Z12.12 ENCOUNTER FOR COLORECTAL CANCER SCREENING: ICD-10-CM

## 2025-02-11 DIAGNOSIS — E06.3 HYPOTHYROIDISM DUE TO HASHIMOTO THYROIDITIS: ICD-10-CM

## 2025-02-11 DIAGNOSIS — Z12.11 ENCOUNTER FOR COLORECTAL CANCER SCREENING: ICD-10-CM

## 2025-02-11 DIAGNOSIS — F33.0 MILD EPISODE OF RECURRENT MAJOR DEPRESSIVE DISORDER: ICD-10-CM

## 2025-02-11 PROBLEM — Z01.419 WOMEN'S ANNUAL ROUTINE GYNECOLOGICAL EXAMINATION: Status: RESOLVED | Noted: 2025-02-10 | Resolved: 2025-02-11

## 2025-02-11 PROBLEM — F33.9 RECURRENT DEPRESSION: Status: RESOLVED | Noted: 2025-02-10 | Resolved: 2025-02-11

## 2025-02-11 PROCEDURE — 99396 PREV VISIT EST AGE 40-64: CPT | Performed by: FAMILY MEDICINE

## 2025-02-11 NOTE — PATIENT INSTRUCTIONS
Prevention Guidelines, Women Ages 40 to 49  Screening tests and vaccines are an important part of managing your health. A screening test is done to find diseases in people who don't have any symptoms. The goal is to find a disease early so lifestyle changes and checkups can reduce the risk of disease. Or the goal may be to detect it early to treat it most effectively. Screening tests are not used to diagnose a disease. But they are used to see if more testing is needed. Health counseling is important, too. Below are guidelines for these, for women ages 40 to 49. Talk with your healthcare provider to make sure you’re up-to-date on what you need.  Screening Who needs it How often   Type 2 diabetes or prediabetes All women beginning at age 40 and women without symptoms at any age who are overweight or obese and have 1 or more additional risk factors for diabetes At least every 3 years     Type 2 diabetes or prediabetes All women diagnosed with gestational diabetes Lifelong testing every 3 years   Type 2 diabetes All women with prediabetes Every year   Alcohol misuse All women in this age group At routine exams   Blood pressure All women in this age group Yearly checkup if your blood pressure is normal  Normal blood pressure is less than 120/80 mm Hg  If your blood pressure reading is higher than normal, follow the advice of your healthcare provider   Breast cancer All women at average risk in this age group Screening with a mammogram can start at age 40. Talk with your healthcare provider to help you decide when to start screening. At age 45 start yearly mammograms.     Cervical cancer All women in this age group, except women who have had a complete hysterectomy Pap test every 3 years or Pap test plus human papilloma virus (HPV) test every 5 years   Colorectal cancer Women age 45 years and older at average risk Multiple tests are available and are used at different times. Possible tests include:  Flexible  sigmoidoscopy every 5 years, or  Colonoscopy every 10 years, or  CT colonography (virtual colonoscopy) every 5 years, or  Yearly fecal occult blood test, or  Yearly fecal immunochemical test every year, or  Stool DNA test, every 3 years or  Double contrast barium enema every 5 years  If you choose a test other than a colonoscopy and have an abnormal test result, you will need to follow-up with a colonoscopy. Screening advice varies among expert groups. Talk with your healthcare provider about which tests are best for you.  Some people should be screened using a different schedule because of their personal or family health history. Talk with your healthcare provider about your health history.   Chlamydia Women at increased risk for infection At routine exams if you're at risk or have symptoms   Depression All women in this age group At routine exams   Gonorrhea Sexually active women at increased risk for infection At routine exams   Hepatitis C Anyone at increased risk; 1 time for those born between 1945 and 1965 At routine exams   High cholesterol or triglycerides All women ages 45 and older who are at risk for coronary artery disease; younger women, talk with your healthcare provider At least every 5 years   HIV All women At routine exams. Those with risk factors for HIV should be tested at least annually.   Obesity All women in this age group At routine exams   Syphilis Women at increased risk for infection: talk with your healthcare provider At routine exams   Tuberculosis Women at increased risk for infection Ask your healthcare provider   Vision All women in this age group Complete exam at age 40 and eye exams every 2 to 4 years. If you have a chronic disease, ask your healthcare provider how often you should have your eyes examined.   Vaccine Who needs it How often   Chickenpox (varicella) All women in this age group who have no record of this infection or vaccine 2 doses; the second dose should be given at  least 4 weeks after the first dose   Hepatitis A Women at increased risk for infection: talk with your healthcare provider 2 doses given 6 months apart   Hepatitis B Women at increased risk for infection: talk with your healthcare provider 3 doses over 6 months; second dose should be given 1 month after the first dose; the third dose should be given at least 2 months after the second dose and at least 4 months after the first dose   Haemophilus influenzae Type B (HIB) Women at increased risk 1 to 3 doses   Influenza (flu) All women in this age group Once a year   Measles, mumps, rubella (MMR) All women in this age group who have no record of these infections or vaccines 1 or 2 doses   Meningococcal Women at increased risk for infection: talk with your healthcare provider 1 or more doses   Pneumococcal conjugate vaccine (PCV13) and pneumococcal polysaccharide vaccine (PPSV23) Women at increased risk for infection: talk with your healthcare provider 1 or 2 doses   Tetanus/diphtheria/pertussis (Td/Tdap) booster All women in this age group A 1-time dose of Tdap instead of a Td booster after age 18, then Td every 10 years   Counseling Who needs it How often   BRCA gene mutation testing for breast and ovarian cancer susceptibility Women with increased risk for having gene mutation When your risk is known   Breast cancer and chemoprevention Women at high risk for breast cancer When your risk is known   Diet and exercise Women who are overweight or obese When diagnosed, and then at routine exams   Domestic violence Women at the age in which they are able to have children At routine exams   Sexually transmitted infection prevention Women at increased risk for infection-talk with your healthcare provider At routine exams   Use of tobacco and the health effects it can cause All women in this age group Every exam   Musa last reviewed this educational content on 5/1/2021 © 2000-2022 [employer]. All rights  reserved. This information is not intended as a substitute for professional medical care. Always follow your healthcare professional's instructions.

## 2025-02-11 NOTE — PROGRESS NOTES
Family Medicine Progress Note    Assessment & Plan:     Follow-Up: Return for as needed.     Assessment & Plan  Encounter for general adult medical examination without abnormal findings  Wellness Exam done today and routine Preventative Care discussed as noted below.   -Pap smear: due - history of Abnormal in the Past, status post LEEP  -Mammo: Ordered   -Healthy eating habits and regular exercise encouraged   Encounter for colorectal cancer screening  49 year old due for CRC screening. Ordered as below   Orders:    COLOGUARD COLON CANCER SCREENING (EXTERNAL)    Mild episode of recurrent major depressive disorder  Chronic, managed by Mental Health APRN; no SI.HI; no additional needs from pcp  Hypothyroidism due to Hashimoto thyroiditis  Chronic, stable, managed by [facility], Last TSH in Nov.          FOLLOW-UP: Return for as needed.     Subjective:      CC: Establish Care (Sees Gyne)      History of Present Illness:  History obtained from patient.     Janee Mcknight is a 49 year old female who presents for Establish Care (Sees Gyne)     ANNUAL PHYSICAL  Patient's last menstrual period was 2025 (approximate).   Cervical Cancer Screening- Due - plans to schedule with GYN    PMH of Abnormal Pap: Yes- status post LEEP.   Breast Cancer Screenin2023 - DUE, has active order  Colon Cancer Screening: due    Exercise: routine strength training >150m/wk    Tobacco/Alcohol: denies    Immunizations: up-to-date     Weight Loss- followed by [facility]   Has been diligent with diet- more anti-inflamm  diet since being diagnosed with Hashimoto's   Had cut out a lot of foods;  Routine exercise, though had persistent weight gain,  Has been on Semaglutide since about Dec --- Has been slowly been adding things back in it  Does find she is having a hard time with appetite and food aversion, working on getting protein in.   Does do protein shakes.      Recurrent MDD- longstanding history-history of CASSANDRA  PHP, followed closely by CASSANDRA CROCKER-(OBI Beth), Also sees Therapist and Couples Therapist through [facility];   Factor V Leiden  Hypothyroidism due to Hashimoto's- NP 60mg AM and 30mg in PM - Managed by Bernarda Rivera PA-C   Vit D Def  ED  History of SHAUNA  History of Abnormal pap: 2016- LEEP-CIN1/LSIL, follow-up pap NIL, HPV+ (2019)  Pshx: Hysteroscopy (-Endo Polyp), LEEP , Colpo,   All: pcn   Fam hx: Breast Ca-m; MI-PGF, M,FL  HLD-F, HTN-F; Suicide-[facility]    Soc hx: Pediatric RN   Ob/gyne: Patient's last menstrual period was 2025 (approximate).. last pap: 2020, last mammogram: 2023,  ,   Colonoscopy: -      History/Other:   ROS-Per HPI     Problem List:  Patient Active Problem List   Diagnosis    History of eating disorder    Hypothyroidism due to Hashimoto thyroiditis    Allergic rhinitis    Mild episode of recurrent major depressive disorder    Vitamin D deficiency    History of HPV infection    History of suicidal ideation    Factor V Leiden (HCC)    Perimenopausal symptoms       Current Medications:  Current Outpatient Medications   Medication Sig Dispense Refill    buPROPion  MG Oral Tablet 12 Hr Take 1 tablet (150 mg total) by mouth daily. 90 tablet 3    NP THYROID 60 MG Oral Tab Take 1 tablet (60 mg total) by mouth daily. 90 tablet 0    CUSTOM MEDICATION Semaglutide 2.5 mg/mL  Dispense: 2 mL vial  Sig: Inject 0.1 mL (0.25 mg) subcutaneously once weekly x 2-3 weeks, then        Inject 0.2 mL (0.5 mg) subcutaneously once weekly X 3 weeks, then if tolerated        Inject 0.4 mL (1 mg) subcutaneously once weekly 1 each 0    thyroid (NP THYROID) 30 MG Oral Tab Take 1 tablet (30 mg total) by mouth daily. 90 tablet 0    MAGNESIUM OR Take by mouth.      Multiple Vitamin (MULTIVITAMIN ADULT OR) Take by mouth.      PREBIOTIC PRODUCT OR Take by mouth. (Patient not taking: Reported on 2/10/2025)      Probiotic Product (PROBIOTIC OR) Take by mouth. (Patient not taking:  Reported on 2/10/2025)        Past Medical History:  Past Medical History:    Allergic rhinitis    Anxiety state, unspecified    Blood disorder    Dehydration    Depression    Depressive disorder, not elsewhere classified    Diarrhea    Dizziness and giddiness    Dysmenorrhea    Eating disorder, unspecified    Factor 5 Leiden mutation, heterozygous (HCC)    Gastroenteritis    Headache(784.0)    Heartburn    Hematuria    w abdominal pain    Hormone disorder    Human papilloma virus infection    Hydronephrosis    mild right and hydrooureter    Hypothyroidism    Hypothyroidism    Lipid screening    Other unknown and unspecified cause of morbidity or mortality    Panic disorder    Paresthesia    Strep pharyngitis    acute    Substance abuse (HCC)    Suicide attempt (HCC)    overdose    Tooth infection    wisdom    Unspecified adjustment reaction    Urgency of urination    Vertigo    chronic    Viral syndrome    Vomiting      Past Surgical History:  Past Surgical History:   Procedure Laterality Date    Colposcopy, cervix w/upper adjacent vagina; w/biopsy(s), cervix      Colposcopy,loop electrd cervix excis N/A 2016    Procedure: LOOP ELECTRICAL EXCISION CERVIX,COLPOSCOPY;  Surgeon: David Rodriguez MD;  Location: [facility]    Hysteroscopy,with sampling N/A 10/21/2015    Procedure: HYSTEROSCOPY W/DILATION AND CURETTAGE;  Surgeon: David Rodriguez MD;  Location: [facility]    Leep        3/31/1997      Family History:  Family History   Problem Relation Age of Onset    Hypertension Father     High Cholesterol Father     Heart Disorder Father     Breast Cancer Mother 64    Cancer Mother     Heart Disorder Mother     Heart Attack Paternal Grandfather     Suicide History Maternal Cousin Male     Ovarian Cancer Neg     Uterine Cancer Neg     Colon Cancer Neg       Social History:  Social History     Socioeconomic History    Marital status:    Occupational History    Occupation: Peds Nurse    Tobacco Use    Smoking status: Never    Smokeless tobacco: Never   Vaping Use    Vaping status: Never Used   Substance and Sexual Activity    Alcohol use: Not Currently     Comment: social, has not had since the end of March 2023    Drug use: No    Sexual activity: Yes     Partners: Male     Birth control/protection: Vasectomy   Other Topics Concern    Caffeine Concern No    Stress Concern Yes    Special Diet Yes    Exercise Yes    Seat Belt Yes       Allergies:  Allergies[1]     Objective:    VITALS: /60   Pulse 80   Temp 97 °F (36.1 °C) (Temporal)   Resp 16   Ht 5' 5\" (1.651 m)   Wt 128 lb (58.1 kg)   LMP 02/08/2025 (Approximate)   SpO2 97%   BMI 21.30 kg/m²      BP Readings from Last 3 Encounters:   02/11/25 102/60   02/10/25 106/58   02/14/24 126/80     Wt Readings from Last 3 Encounters:   02/11/25 128 lb (58.1 kg)   02/10/25 127 lb (57.6 kg)   02/14/24 119 lb (54 kg)       PHYSICAL EXAM  GEN: pleasant, well-appearing in NAD, AOX3  SKIN: no visible rashes, lesions, or evidence of trauma  HEENT: PERRL, EOMI, moist mucous membranes, oropharynx clear, TM clear, nares patent, no Thyromegaly or nodule.   CV: RRR, no murmurs or abnl heart sounds   PULM: Clear to auscultation, No wheezes, rales, rhonchi.  Non-labored breathing.  ABD: Soft, non-tender, non-distended, + BS, no rigidity/guarding  EXT:  Warm, well perfused, no lower extremity edema  NEURO: CNs grossly intact, no focal weakness  MSK: moves all 4 extremities without difficulty  PSYCH: mood and affect are appropriate       Lab Results   Component Value Date     12/07/2024    A1C 5.5 12/07/2024     Lab Results   Component Value Date/Time    CHOLEST 240.00 (H) 08/16/2019 01:15 PM    TRIG 48.00 08/16/2019 01:15 PM    HDL 66 08/16/2019 01:15 PM     (H) 08/16/2019 01:15 PM    NONHDLC 136 (H) 11/22/2016 01:54 PM       Lab Results   Component Value Date    WBC 7.8 03/26/2023    RBC 4.97 03/26/2023    HGB 14.4 03/26/2023    HCT 42.0  03/26/2023    MCV 84.5 03/26/2023    MCH 29.0 03/26/2023    MCHC 34.3 03/26/2023    RDW 13.0 03/26/2023    .0 03/26/2023    MPV 10.5 (H) 10/08/2012      Lab Results   Component Value Date    GLU 94 02/27/2024    BUN 18 02/27/2024    CREATSERUM 0.93 02/27/2024    ANIONGAP 2 02/27/2024    GFR 79 11/22/2016    GFRNAA 86 10/08/2012    GFRAA > 90 10/08/2012    CA 9.0 02/27/2024    OSMOCALC 286 02/27/2024    ALKPHO 57 02/27/2024    AST 23 02/27/2024    ALT 15 02/27/2024    BILT 0.4 02/27/2024    TP 7.5 02/27/2024    ALB 3.9 02/27/2024    GLOBULIN 3.6 02/27/2024    AGRATIO 1.6 10/19/2015     02/27/2024    K 3.5 02/27/2024     02/27/2024    CO2 26.0 02/27/2024                Daisy Machado, DO    NOTE TO PATIENT: The 21st Century Cures Act makes clinical notes like these available to patients in the interest of transparency. Clinical notes are medical documents used by physicians and care providers to communicate with each other. These documents include medical language and terminology, abbreviations, and treatment information that may sound technical and at times possibly unfamiliar. In addition, at times, the verbiage may appear blunt or direct. These documents are one tool providers use to communicate relevant information and clinical opinions of the care providers in a way that allows common understanding of the clinical context.           [1]   Allergies  Allergen Reactions    Penicillins HIVES    Lamictal [Lamotrigine] RASH

## 2025-02-26 PROCEDURE — 88305 TISSUE EXAM BY PATHOLOGIST: CPT | Performed by: STUDENT IN AN ORGANIZED HEALTH CARE EDUCATION/TRAINING PROGRAM

## 2025-02-27 ENCOUNTER — LAB REQUISITION (OUTPATIENT)
Dept: LAB | Facility: HOSPITAL | Age: 50
End: 2025-02-27
Payer: COMMERCIAL

## 2025-02-27 DIAGNOSIS — D48.5 NEOPLASM OF UNCERTAIN BEHAVIOR OF SKIN: ICD-10-CM

## 2025-03-10 ENCOUNTER — MED REC SCAN ONLY (OUTPATIENT)
Dept: FAMILY MEDICINE CLINIC | Facility: CLINIC | Age: 50
End: 2025-03-10

## 2025-03-18 ENCOUNTER — TELEMEDICINE (OUTPATIENT)
Dept: INTEGRATIVE MEDICINE | Facility: CLINIC | Age: 50
End: 2025-03-18
Payer: COMMERCIAL

## 2025-03-18 DIAGNOSIS — F43.9 STRESS: ICD-10-CM

## 2025-03-18 DIAGNOSIS — K58.2 IRRITABLE BOWEL SYNDROME WITH BOTH CONSTIPATION AND DIARRHEA: ICD-10-CM

## 2025-03-18 DIAGNOSIS — R14.0 ABDOMINAL BLOATING: ICD-10-CM

## 2025-03-18 DIAGNOSIS — R63.5 WEIGHT GAIN: ICD-10-CM

## 2025-03-18 DIAGNOSIS — E06.3 HYPOTHYROIDISM DUE TO HASHIMOTO'S THYROIDITIS: Primary | ICD-10-CM

## 2025-03-18 DIAGNOSIS — N92.6 IRREGULAR MENSES: ICD-10-CM

## 2025-03-18 DIAGNOSIS — E34.8 ENDOCRINE AXIS DYSFUNCTION: ICD-10-CM

## 2025-03-18 DIAGNOSIS — R73.03 PREDIABETES: ICD-10-CM

## 2025-03-18 PROCEDURE — 98007 SYNCH AUDIO-VIDEO EST HI 40: CPT | Performed by: PHYSICIAN ASSISTANT

## 2025-03-18 NOTE — PATIENT INSTRUCTIONS
Nutritionist Gail Francisco  [phone]  [email or web address]     2. Get labs done in the AM after fasting overnight.    3. May switch to the NuAdapt once you finish the herbs from acupuncture.     4. Consider Declan

## 2025-03-18 NOTE — PROGRESS NOTES
Janee Mcknight is a 50 year old female.  Chief Complaint   Patient presents with    Follow - Up       HPI:   50yo pt presents today via video to f/u.   Labs - 12/7/24    Weight Loss Resistance- improved   Started the Semaglutide 12/9/24, but has been slow to increase due to nausea and aversion to food. On 0.5mg. Has lost 15lbs, maybe only needs 5lbs more.   Feels she needs to dial in the protein - feels not a lot of options.    HgA1c improved!    Was seeing acupuncture with Dr. Frank - started herbs so did not start the NuAdapt. Never felt comfortable and was nervous.     Hypothyroidism with elevated TPO antibodies- has been on NP thyroid since 4/3/24 - increased to 60mg in Dec  TSH in Sept improved, slight improvement in energy. Still with very low FT4 and Free T3     Hormone imbalance -Low adrenal hormone and low progesterone, high normal estradiol, high SHBG.   Menses skipping   FDLMP Feb 8th, prior to that was 2mos and has not gotten a period yet this month.  Feels like she has PMS majority of the month. Swollen breasts, tired, crampy around period, or when she should be getting her period.      IBS - C>D abdominal bloating now resolved, daily BM.   Better since starting new thyroid medication and eliminating foods that tested high on the KBMO fit test  --------------------------------------------Last OV---------------------------------------------------    Slightly more energy on some days  Still with brain fog and intermittent joint pain  Less bloating  Weight loss resistance - 10-15lbs over her optimal weight despite cutting out all the inflammatory foods  Inquiring about a nutritionist to help her navigate     Will have 3rd acupuncture today - Dr Frank rec herb Jodi Sams Ferny  Feel as if she is on a lot of supplements    Hypothyroidism with elevated TPO antibodies- TSH in Sept improved, slight improvement in energy. Still with very low FT4 and Free T3    Hormone imbalance -Low adrenal hormone and low  progesterone, high normal estradiol, high SHBG.   Reg Menses - Last month period lasted 12days  Feels like she has PMS majority of the month. Swollen breasts, tired, crampy.     IBS - C>D with abdominal bloating.    Slightly better since starting new thyroid medication and eliminating foods that tested high on the KBMO fit test    ------------------------------------Last OV 8/21/24----------------------------------    Walking daily (10-12k steps) and completely changed diet but still feeling heavy, bloated, inflamed, brain fog.  Has energy in the morning and then no motivation as day goes on.  Significant amount of stress recently.  Hurt elbow so has not been doing strength training/weights anymore    Did add the 15mg of NP Thyroid in the afternoon, 60mg in the AM. Not noticed much of a difference.    Keeping up with food eliminations.   BM now more regular  +Bloating less, no longer daily - but admits anxious even when eating    Menses -   Feels like she has PMS majority of the month. Swollen breasts, tired, crampy.     Sleeping ok - taking the FLORES and Mg    --------------------------------------------Last OV------------------------------------    Removed soy, corn, gluten, caffeine, and dairy since reading Hashimoto's protocol. Then started to remove all the higher reactive foods on the FIT test x3wks.     Fatigue and brain fog and weight loss resistance persists.   GI better.  Sleeping better.     Started the Vit D and FLORES at night x3-4weeks.  Started a wellness journal.     Anxiety (towards end of day - hard to turn brain off), insomnia, brain fog, energy fades in afternoon.   On Wellbutrin and in therapy twice weekly.   H/o depression and anxiety, but worse in the last year after significant stressors. Able to do daily routines but not motivated to go out much.     Hypothyroid - Switched from Levothyroxine (on for over 20yrs) to NP thyroid x 5 weeks ago. PCP had been managing.    ----------------------------------------------------------    Menses mostly regular, perhaps shortening cycles, every 3-4weeks approx  - flow is heavy at times and 1 day of significant cramping. More emotional lately.   Has \"Better hormones\" drink daily that may have chasteberry, ashwagandha, and inositol (250mg) x1yr    Insomnia - Will wake up more frequently and with night terrors.  works night shift x20yrs.    Weight loss resistance - Started berberine x2-3wks to help with blood sugar but has not been taking regularly.    IBS-C>D, bloating - stools have been more regular.   BM consistency changes a lot, but not typically pellets    H/o Factor V Leiden    REVIEW OF SYSTEMS:   Review of Systems     Negative except for pertinent ROS in HPI      FAMILY HISTORY:      Family History   Problem Relation Age of Onset    Hypertension Father     High Cholesterol Father     Heart Disorder Father     Breast Cancer Mother 64    Cancer Mother     Heart Disorder Mother     Heart Attack Paternal Grandfather     Suicide History Maternal Cousin Male     Ovarian Cancer Neg     Uterine Cancer Neg     Colon Cancer Neg        MEDICAL HISTORY:     Past Medical History:    Allergic rhinitis    Anxiety state, unspecified    Blood disorder    Dehydration    Depression    Depressive disorder, not elsewhere classified    Diarrhea    Dizziness and giddiness    Dysmenorrhea    Eating disorder, unspecified    Factor 5 Leiden mutation, heterozygous (HCC)    Gastroenteritis    Headache(784.0)    Heartburn    Hematuria    w abdominal pain    Hormone disorder    Human papilloma virus infection    Hydronephrosis    mild right and hydrooureter    Hypothyroidism    Hypothyroidism    Lipid screening    Other unknown and unspecified cause of morbidity or mortality    Panic disorder    Paresthesia    Strep pharyngitis    acute    Substance abuse (HCC)    Suicide attempt (HCC)    overdose    Tooth infection    wisdom    Unspecified adjustment reaction     Urgency of urination    Vertigo    chronic    Viral syndrome    Vomiting       CURRENT MEDICATIONS:     Current Outpatient Medications   Medication Sig Dispense Refill    CUSTOM MEDICATION Semaglutide 2.5 mg/mL  Dispense: 2 mL vial  Sig: Inject 0.2 mL (0.5 mg) subcutaneously once weekly X 3 weeks, then if tolerated        Inject 0.4 mL (1 mg) subcutaneously once weekly 1 each 0    buPROPion  MG Oral Tablet 12 Hr Take 1 tablet (150 mg total) by mouth daily. 90 tablet 3    NP THYROID 60 MG Oral Tab Take 1 tablet (60 mg total) by mouth daily. 90 tablet 0    thyroid (NP THYROID) 30 MG Oral Tab Take 1 tablet (30 mg total) by mouth daily. 90 tablet 0    MAGNESIUM OR Take by mouth.      Multiple Vitamin (MULTIVITAMIN ADULT OR) Take by mouth.      Probiotic Product (PROBIOTIC OR) Take by mouth. (Patient not taking: Reported on 3/18/2025)         SOCIAL HISTORY:   Lifestyle Factors affecting health:    Diet - Trying to Increase fiber to 25-30g and protein.  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   Social History     Socioeconomic History    Marital status:    Occupational History    Occupation: Peds Nurse   Tobacco Use    Smoking status: Never    Smokeless tobacco: Never   Vaping Use    Vaping status: Never Used   Substance and Sexual Activity    Alcohol use: Not Currently     Comment: social, has not had since the end of March 2023    Drug use: No    Sexual activity: Yes     Partners: Male     Birth control/protection: Vasectomy   Other Topics Concern    Caffeine Concern No    Stress Concern Yes    Special Diet Yes    Exercise Yes    Seat Belt Yes       SURGICAL HISTORY:     Past Surgical History:   Procedure Laterality Date    Colposcopy, cervix  w/upper adjacent vagina; w/biopsy(s), cervix      Colposcopy,loop electrd cervix excis N/A 2016    Procedure: LOOP ELECTRICAL EXCISION CERVIX,COLPOSCOPY;  Surgeon: David Rodriguez MD;  Location: [facility]    Hysteroscopy,with sampling N/A 10/21/2015    Procedure: HYSTEROSCOPY W/DILATION AND CURETTAGE;  Surgeon: David Rodriguez MD;  Location: [facility]    Leep        3/31/1996       PHYSICAL EXAM:     There were no vitals filed for this visit.      Physical Exam  Constitutional:       General: She is not in acute distress.     Appearance: Normal appearance. She is not ill-appearing or toxic-appearing.   HENT:      Head: Normocephalic and atraumatic.   Eyes:      Extraocular Movements: Extraocular movements intact.   Pulmonary:      Effort: Pulmonary effort is normal. No respiratory distress.   Musculoskeletal:      Cervical back: Normal range of motion.   Skin:     Coloration: Skin is not jaundiced.      Findings: No lesion.   Neurological:      Mental Status: She is alert and oriented to person, place, and time.   Psychiatric:         Mood and Affect: Mood normal.         Behavior: Behavior normal.         Thought Content: Thought content normal.         Judgment: Judgment normal.          ASSESSMENT AND PLAN:     Lab Requisition on 2025   Component Date Value Ref Range Status    Case Report 2025    Final                    Value:Technical Only                                    Case: [id number]                                   Authorizing Provider:  Teofilo Gordon    Collected:           2025                   Ordering Location:     [facility]     Received:            2025 11:27 AM                                 [place], [facility]                                                                   Pathologist:           Ruth Lu MD                                                              Specimen:    Skin, left posterior shoulder                                                              Final Diagnosis: 02/26/2025    Final                    Value:Technical stain only, no interpretation      Gross Description 02/26/2025    Final                    Value:Received in formalin labeled the patient's name and \"left posterior shoulder\".  It consists of 1 unoriented skin shave measuring 0.6 x 0.5 cm.  The minimal uninvolved skin surface is tan-brown to gray, slightly granular.  Abutting skin edge, is a 0.5 x 0.5 cm flat, ill-defined, tan-white to gray, smooth lesion.  The deep surface is inked blue. Technical stain only, no interpretation.  The specimen is submitted intact in A1 to be cut by histology.  (TB)     [facility] Lab Encounter on 12/07/2024   Component Date Value Ref Range Status    HgbA1C 12/07/2024 5.5  <5.7 % Final     Normal HbA1C:     <5.7%      Pre-Diabetic:     5.7 - 6.4%      Diabetic:         >6.4%      Diabetic Control: <7.0%        Estimated Average Glucose 12/07/2024 111  68 - 126 mg/dL Final    eAG is the estimated average glucose calculated from Hgb A1c according to the formula recommended by the American Diabetes Association. eAG levels reflect the long term average glucose and may not correlate with random or fasting glucose levels since these represent specific points in time.           TSH 12/07/2024 0.457 (L)  0.550 - 4.780 uIU/mL Final    Free T4 12/07/2024 0.9  0.8 - 1.7 ng/dL Final    T3 Free 12/07/2024 3.24  2.40 - 4.20 pg/mL Final    Reverse T3 12/07/2024 9.8  9.2 - 24.1 ng/dL Final    Vitamin B12 12/07/2024 868  211 - 911 pg/mL Final    Vitamin B12 Reference Range   Deficient:      <150 pg/mL   Indeterminate   150 - 211 pg/mL  Normal:         211 - 911 pg/mL       Vitamin D, 25OH, Total 12/07/2024 66.8  30.0 - 100.0 ng/mL Final    Literature Recommendations for 25(OH)D levels are:  Range           Vitamin D Status   <20    ng/mL       Deficiency   20-<30 ng/mL      Insufficiency    ng/mL      Sufficiency   >100   ng/mL      Toxicity    *Clinical controversy exists regarding optimal 25(OH)D levels. Emerging evidence links potential adverse effects to high levels, particularly >60 ng/mL.          No results found.    1. Hypothyroidism due to Hashimoto's thyroiditis  - Comp Metabolic Panel (14); Future  - Dehydroepiandrosterone Sulfate; Future  - Estrogens Fractionated, Serum; Future  - Pregnenolone by MS/MS, Serum; Future  - Progesterone; Future  - Testosterone,Total and Weakly Bound w/ SHBG; Future  - Hemoglobin A1C; Future  - Insulin; Future  - Assay, Thyroid Stim Hormone; Future  - Free T4, (Free Thyroxine); Future  - Thyroid Peroxidase (TPO) AB; Future  - Thyroid Antithyroglobulin AB; Future  - Reverse T3, Serum; Future  - Free T3 (Triiodothryronine); Future    2. Stress  - [facility] ([place]) - Internal Referral    3. Irritable bowel syndrome with both constipation and diarrhea    4. Abdominal bloating    5. Endocrine axis dysfunction  - [facility] ([place]) - Internal Referral  - Comp Metabolic Panel (14); Future  - Dehydroepiandrosterone Sulfate; Future  - Estrogens Fractionated, Serum; Future  - Pregnenolone by MS/MS, Serum; Future  - Progesterone; Future  - Testosterone,Total and Weakly Bound w/ SHBG; Future  - Hemoglobin A1C; Future  - Insulin; Future  - Assay, Thyroid Stim Hormone; Future  - Free T4, (Free Thyroxine); Future  - Thyroid Peroxidase (TPO) AB; Future  - Thyroid Antithyroglobulin AB; Future  - Reverse T3, Serum; Future  - Free T3 (Triiodothryronine); Future    6. Prediabetes  - Hemoglobin A1C; Future  - Insulin; Future    7. Weight gain    8. Irregular menses  - Dehydroepiandrosterone Sulfate; Future  - Estrogens Fractionated, Serum; Future  - Pregnenolone by MS/MS, Serum; Future  - Progesterone; Future  - Testosterone,Total and Weakly Bound w/ SHBG;  Future            This visit was conducted using Telemedicine with live, interactive video and audio.   The patient understands the risks and benefits of Telemedicine and that a Telemedicine visit limits the ability to perform a thorough physical examination which may affect objective findings related to specific symptoms and conditions which can, in turn, affect treatment.     The patient was located in the [facility] at the time of the encounter.      Time spent with patient: Over 40 minutes spent in chart review and in direct communication with patient obtaining and reviewing history, creating a unique care plan, explaining the rationale for treatment, reviewing potential SE and overall treatment plan,  documenting all clinical information in Epic. Over 50% of this time was in education, counseling and coordination of care.     Problem List Items Addressed This Visit    None  Visit Diagnoses       Hypothyroidism due to Hashimoto's thyroiditis    -  Primary    Relevant Orders    Comp Metabolic Panel (14)    Dehydroepiandrosterone Sulfate    Estrogens Fractionated, Serum    Pregnenolone by MS/MS, Serum    Progesterone    Testosterone,Total and Weakly Bound w/ SHBG    Hemoglobin A1C    Insulin    Assay, Thyroid Stim Hormone    Free T4, (Free Thyroxine)    Thyroid Peroxidase (TPO) AB    Thyroid Antithyroglobulin AB    Reverse T3, Serum    Free T3 (Triiodothryronine)    Stress        Relevant Orders    [facility] ([place] - Internal Referral    Irritable bowel syndrome with both constipation and diarrhea        Abdominal bloating        Endocrine axis dysfunction        Relevant Orders    [facility] ([place] - Internal Referral    Comp Metabolic Panel (14)    Dehydroepiandrosterone Sulfate    Estrogens Fractionated, Serum    Pregnenolone by MS/MS, Serum    Progesterone    Testosterone,Total and Weakly Bound w/ SHBG    Hemoglobin A1C    Insulin    Assay,  Thyroid Stim Hormone    Free T4, (Free Thyroxine)    Thyroid Peroxidase (TPO) AB    Thyroid Antithyroglobulin AB    Reverse T3, Serum    Free T3 (Triiodothryronine)    Prediabetes        Relevant Orders    Hemoglobin A1C    Insulin    Weight gain        Irregular menses        Relevant Orders    Dehydroepiandrosterone Sulfate    Estrogens Fractionated, Serum    Pregnenolone by MS/MS, Serum    Progesterone    Testosterone,Total and Weakly Bound w/ SHBG                   Endocrine:  Hypothyroidism with elevated TPO antibodies-has been on NP thyroid since 4/3/24,and as of Dec reached a therapeutic level in the labs and now more successful with weight loss and BMs have regulated.  -> Check labs     Hormone imbalance -low DHEA/progesterone, high normal estradiol, high SHBG.   -> Recheck hormones   -> Discontinue acupuncture as it was consistently making her more nervous  -> Once finished the herbs start on nu adapt, BID    Weight Loss Resistance -successful weight loss of 15 pounds and improvements in Hg A1c.   -> Continue on lowest effective dose that does not cause side effects or food aversion.  She may stepdown at this time and maintain until the next follow-up.    GI:  IBS - C>D improving and abdominal bloating now resolved.  Slight improvements noticed with new thyroid medication and eliminating foods that tested high on the KBMO fit test (see download).    [4+ reaction to casein, wheat, cranberry, cinnamon, garlic, pine nut.  3+ reaction to cows milk, whey, eggs, wheat, and Azael seed.]    Having difficulty finding what to eat and getting protein in as not a lot of foods sound good at this point.  Meal plan shared with patient.    Cardiometabolic:  Prediabetes-- hemoglobin A1c improved from 5.7-5.5 in December labs  -Started inositol with selenium to help with blood sugar regulation and anxiety  -> Recheck hemoglobin A1c       HPA Axis: Increased stress, anxiety and depression. Working with a therapist and on meds.    -DHEA low,  supplementing 5 mg every morning  -> Start on nu adapt  -> Recommend reiki    Insomnia - improved    Given further recommendations as below    Orders Placed This Visit:  Orders Placed This Encounter   Procedures    Comp Metabolic Panel (14)    Dehydroepiandrosterone Sulfate    Estrogens Fractionated, Serum    Pregnenolone by MS/MS, Serum    Progesterone    Testosterone,Total and Weakly Bound w/ SHBG    Hemoglobin A1C    Insulin    Assay, Thyroid Stim Hormone    Free T4, (Free Thyroxine)    Thyroid Peroxidase (TPO) AB    Thyroid Antithyroglobulin AB    Reverse T3, Serum    Free T3 (Triiodothryronine)     Orders Placed This Encounter   Procedures    [facility] ([place]) - Internal Referral       Patient Instructions   Nutritionist Gail Francisco  [phone]  [place]     2. Get labs done in the AM after fasting overnight.    3. May switch to the NuAdapt once you finish the herbs from acupuncture.     4. Consider Reiki    Return in about 4 months (around 7/18/2025) for x60min: lab results.    Patient affirmed understanding of plan and all questions were answered.     Bernarda Rivera PA-C

## 2025-03-24 RX ORDER — LEVOTHYROXINE, LIOTHYRONINE 38; 9 UG/1; UG/1
60 TABLET ORAL DAILY
Qty: 90 TABLET | Refills: 1 | Status: SHIPPED | OUTPATIENT
Start: 2025-03-24

## 2025-03-24 RX ORDER — THYROID 30 MG/1
30 TABLET ORAL DAILY
Qty: 90 TABLET | Refills: 1 | Status: SHIPPED | OUTPATIENT
Start: 2025-03-24

## 2025-04-12 ENCOUNTER — LAB ENCOUNTER (OUTPATIENT)
Dept: LAB | Facility: HOSPITAL | Age: 50
End: 2025-04-12
Attending: PHYSICIAN ASSISTANT
Payer: COMMERCIAL

## 2025-04-12 DIAGNOSIS — N92.6 IRREGULAR MENSES: ICD-10-CM

## 2025-04-12 DIAGNOSIS — R73.03 PREDIABETES: ICD-10-CM

## 2025-04-12 DIAGNOSIS — E06.3 HYPOTHYROIDISM DUE TO HASHIMOTO'S THYROIDITIS: ICD-10-CM

## 2025-04-12 DIAGNOSIS — E34.8 ENDOCRINE AXIS DYSFUNCTION: ICD-10-CM

## 2025-04-12 LAB
ALBUMIN SERPL-MCNC: 4.5 G/DL (ref 3.2–4.8)
ALBUMIN/GLOB SERPL: 1.5 {RATIO} (ref 1–2)
ALP LIVER SERPL-CCNC: 66 U/L (ref 39–100)
ALT SERPL-CCNC: 15 U/L (ref 10–49)
ANION GAP SERPL CALC-SCNC: 8 MMOL/L (ref 0–18)
AST SERPL-CCNC: 16 U/L (ref ?–34)
BILIRUB SERPL-MCNC: 0.7 MG/DL (ref 0.3–1.2)
BUN BLD-MCNC: 13 MG/DL (ref 9–23)
CALCIUM BLD-MCNC: 9.6 MG/DL (ref 8.7–10.6)
CHLORIDE SERPL-SCNC: 102 MMOL/L (ref 98–112)
CO2 SERPL-SCNC: 28 MMOL/L (ref 21–32)
CREAT BLD-MCNC: 1.01 MG/DL (ref 0.55–1.02)
DHEA-S SERPL-MCNC: 136.5 UG/DL (ref 25.9–460.2)
EGFRCR SERPLBLD CKD-EPI 2021: 68 ML/MIN/1.73M2 (ref 60–?)
EST. AVERAGE GLUCOSE BLD GHB EST-MCNC: 103 MG/DL (ref 68–126)
FASTING STATUS PATIENT QL REPORTED: YES
GLOBULIN PLAS-MCNC: 3 G/DL (ref 2–3.5)
GLUCOSE BLD-MCNC: 84 MG/DL (ref 70–99)
HBA1C MFR BLD: 5.2 % (ref ?–5.7)
INSULIN SERPL-ACNC: 5.1 MU/L (ref 3–25)
OSMOLALITY SERPL CALC.SUM OF ELEC: 285 MOSM/KG (ref 275–295)
POTASSIUM SERPL-SCNC: 4.1 MMOL/L (ref 3.5–5.1)
PROGEST SERPL-MCNC: 0.45 NG/ML
PROT SERPL-MCNC: 7.5 G/DL (ref 5.7–8.2)
SODIUM SERPL-SCNC: 138 MMOL/L (ref 136–145)
T3FREE SERPL-MCNC: 2.84 PG/ML (ref 2.4–4.2)
T4 FREE SERPL-MCNC: 1.1 NG/DL (ref 0.8–1.7)
THYROGLOB SERPL-MCNC: 26 U/ML (ref ?–60)
THYROPEROXIDASE AB SERPL-ACNC: 77 U/ML (ref ?–60)
TSI SER-ACNC: 0.26 UIU/ML (ref 0.55–4.78)

## 2025-04-12 PROCEDURE — 84144 ASSAY OF PROGESTERONE: CPT

## 2025-04-12 PROCEDURE — 84443 ASSAY THYROID STIM HORMONE: CPT

## 2025-04-12 PROCEDURE — 84482 T3 REVERSE: CPT

## 2025-04-12 PROCEDURE — 82627 DEHYDROEPIANDROSTERONE: CPT

## 2025-04-12 PROCEDURE — 83525 ASSAY OF INSULIN: CPT

## 2025-04-12 PROCEDURE — 80053 COMPREHEN METABOLIC PANEL: CPT

## 2025-04-12 PROCEDURE — 84140 ASSAY OF PREGNENOLONE: CPT

## 2025-04-12 PROCEDURE — 86376 MICROSOMAL ANTIBODY EACH: CPT

## 2025-04-12 PROCEDURE — 82671 ASSAY OF ESTROGENS: CPT

## 2025-04-12 PROCEDURE — 86800 THYROGLOBULIN ANTIBODY: CPT

## 2025-04-12 PROCEDURE — 84439 ASSAY OF FREE THYROXINE: CPT

## 2025-04-12 PROCEDURE — 84481 FREE ASSAY (FT-3): CPT

## 2025-04-12 PROCEDURE — 83036 HEMOGLOBIN GLYCOSYLATED A1C: CPT

## 2025-04-12 PROCEDURE — 36415 COLL VENOUS BLD VENIPUNCTURE: CPT

## 2025-04-12 PROCEDURE — 84410 TESTOSTERONE BIOAVAILABLE: CPT

## 2025-04-15 ENCOUNTER — HOSPITAL ENCOUNTER (OUTPATIENT)
Dept: MAMMOGRAPHY | Age: 50
Discharge: HOME | End: 2025-04-15
Attending: OBSTETRICS & GYNECOLOGY
Payer: COMMERCIAL

## 2025-04-15 DIAGNOSIS — Z12.31 BREAST CANCER SCREENING BY MAMMOGRAM: ICD-10-CM

## 2025-04-15 LAB
ESTRADIOL: 373 PG/ML
ESTRONE: 143 PG/ML

## 2025-04-15 PROCEDURE — 77063 BREAST TOMOSYNTHESIS BI: CPT | Performed by: OBSTETRICS & GYNECOLOGY

## 2025-04-15 PROCEDURE — 77067 SCR MAMMO BI INCL CAD: CPT | Performed by: OBSTETRICS & GYNECOLOGY

## 2025-04-17 LAB
SEX HORM BIND GLOB: 191 NMOL/L
TESTOST % FREE+WEAK BND: 8.2 %
TESTOST FREE+WEAK BND: 3.4 NG/DL
TESTOSTERONE TOT /MS: 41.8 NG/DL

## 2025-04-18 LAB
PREGNENOLONE: 63 NG/DL
REVERSE T3: 10.4 NG/DL

## 2025-05-09 NOTE — TELEPHONE ENCOUNTER
Please review; protocol failed/ has no protocol        Please see patients MyChart Message    Janee Messina [facility] Clinical Staff (supporting Bernarda Rivera PA-C)13 hours ago (5:27 PM)     [facility]  I am currently taking  0.75 mg (30 units) I am not having any side effects.

## 2025-06-27 ENCOUNTER — NURSE TRIAGE (OUTPATIENT)
Dept: FAMILY MEDICINE CLINIC | Facility: CLINIC | Age: 50
End: 2025-06-27

## 2025-06-27 ENCOUNTER — PATIENT MESSAGE (OUTPATIENT)
Dept: INTEGRATIVE MEDICINE | Facility: CLINIC | Age: 50
End: 2025-06-27

## 2025-06-27 DIAGNOSIS — E06.3 HYPOTHYROIDISM DUE TO HASHIMOTO'S THYROIDITIS: Primary | ICD-10-CM

## 2025-06-27 DIAGNOSIS — L29.9 PRURITUS: ICD-10-CM

## 2025-06-27 NOTE — TELEPHONE ENCOUNTER
RN s/w patient    Pt stated she has had itching in the past and was well controlled until May when the itching started back up again. Pt stated the itching started on her legs and have progressively gotten worse. Pt stated she now has itching on her legs, arms, armpits, lower abdomen. Pt stated the itching gets worse with heat and cold. Pt stated she does not have a rash or any signs of insect bite. Pt stated she has not started any new medications or supplements, detergents, body wash, lotions.     Pt stated she did contact her PCP but unable to get an appointment until 7/11. Pt wanting to get Bernarda's input.    Advised pt to go to [facility] if itching becomes more severe. Pt verbalized understanding.        Reason for Disposition   Itching is a chronic symptom (recurrent or ongoing AND present > 4 weeks)    Protocols used: Itching - Widespread-A-OH

## 2025-06-27 NOTE — TELEPHONE ENCOUNTER
Action Requested: Summary for Provider     []  Critical Lab, Recommendations Needed  [x] Need Additional Advice  []   FYI    []   Need Orders  [] Need Medications Sent to Pharmacy  []  Other     SUMMARY: No appointments available until 7/11, please advise if you can see patient sooner or further recommendation for patient, advised on home care advise as well     Reason for call: Itchiness  Onset: since may     Patient called and states her Legs are itchy, states this started in May and have progressively gotten worse  Has tried benadryl  Denies new product use  She has applied lotion and that makes it worse  Cold also makes it worse  She has tried infrared sauna and that makes this worse as well.   Itchiness is making it hard for her to sleep     No appointments available until 7/11, can you accommodate patient for a sooner appointment?      Reason for Disposition   MODERATE-SEVERE widespread itching (i.e., interferes with sleep, normal activities or school) and not improved after 24 hours of itching Care Advice    Protocols used: Itching - Widespread-A-OH

## 2025-06-30 NOTE — TELEPHONE ENCOUNTER
Recc Cetaphil/CerVE and daily Zyrtec/Claritin/Allegra  Sign up for wait list.  Limited access given provider will be out of the office

## 2025-07-12 ENCOUNTER — LAB ENCOUNTER (OUTPATIENT)
Dept: LAB | Facility: HOSPITAL | Age: 50
End: 2025-07-12
Attending: PHYSICIAN ASSISTANT
Payer: COMMERCIAL

## 2025-07-12 DIAGNOSIS — L29.9 PRURITUS: ICD-10-CM

## 2025-07-12 DIAGNOSIS — E06.3 HYPOTHYROIDISM DUE TO HASHIMOTO'S THYROIDITIS: ICD-10-CM

## 2025-07-12 LAB
T3FREE SERPL-MCNC: 3.12 PG/ML (ref 2.4–4.2)
T4 FREE SERPL-MCNC: 1 NG/DL (ref 0.8–1.7)
TSI SER-ACNC: 0.19 UIU/ML (ref 0.55–4.78)

## 2025-07-12 PROCEDURE — 86003 ALLG SPEC IGE CRUDE XTRC EA: CPT

## 2025-07-12 PROCEDURE — 84443 ASSAY THYROID STIM HORMONE: CPT

## 2025-07-12 PROCEDURE — 82785 ASSAY OF IGE: CPT

## 2025-07-12 PROCEDURE — 84439 ASSAY OF FREE THYROXINE: CPT

## 2025-07-12 PROCEDURE — 84481 FREE ASSAY (FT-3): CPT

## 2025-07-12 PROCEDURE — 84482 T3 REVERSE: CPT

## 2025-07-12 PROCEDURE — 36415 COLL VENOUS BLD VENIPUNCTURE: CPT

## 2025-07-14 LAB
A ALTERNATA IGE QN: 1.38 KUA/L (ref ?–0.1)
A FUMIGATUS IGE QN: <0.1 KUA/L (ref ?–0.1)
AMER SYCAMORE IGE QN: <0.1 KUA/L (ref ?–0.1)
BAKER'S YEAST IGE QN: <0.1 KUA/L (ref ?–0.1)
BARLEY IGE QN: <0.1 KUA/L (ref ?–0.1)
BEEF IGE QN: <0.1 KUA/L (ref ?–0.1)
BERMUDA GRASS IGE QN: <0.1 KUA/L (ref ?–0.1)
BOXELDER IGE QN: <0.1 KUA/L (ref ?–0.1)
C HERBARUM IGE QN: <0.1 KUA/L (ref ?–0.1)
CALIF WALNUT IGE QN: <0.1 KUA/L (ref ?–0.1)
CAT DANDER IGE QN: <0.1 KUA/L (ref ?–0.1)
CHICKEN MEAT IGE QN: <0.1 KUA/L (ref ?–0.1)
CMN PIGWEED IGE QN: <0.1 KUA/L (ref ?–0.1)
COCOA IGE QN: <0.1 KUA/L (ref ?–0.1)
COMMON RAGWEED IGE QN: <0.1 KUA/L (ref ?–0.1)
CORN IGE QN: <0.1 KUA/L (ref ?–0.1)
COTTONWOOD IGE QN: <0.1 KUA/L (ref ?–0.1)
COW MILK IGE QN: <0.1 KUA/L (ref ?–0.1)
D FARINAE IGE QN: <0.1 KUA/L (ref ?–0.1)
D PTERONYSS IGE QN: 0.1 KUA/L (ref ?–0.1)
DOG DANDER IGE QN: <0.1 KUA/L (ref ?–0.1)
EGG WHITE IGE QN: <0.1 KUA/L (ref ?–0.1)
GLUTEN IGE QN: <0.1 KUA/L (ref ?–0.1)
IGE SERPL-ACNC: 14 KU/L (ref 2–214)
IGE SERPL-ACNC: 14.2 KU/L (ref 2–214)
LETTUCE IGE QN: <0.1 KUA/L (ref ?–0.1)
M RACEMOSUS IGE QN: <0.1 KUA/L (ref ?–0.1)
MALT IGE QN: <0.1 KUA/L (ref ?–0.1)
MARSH ELDER IGE QN: <0.1 KUA/L (ref ?–0.1)
MOUSE EPITH IGE QN: <0.1 KUA/L (ref ?–0.1)
MT JUNIPER IGE QN: <0.1 KUA/L (ref ?–0.1)
OAT IGE QN: <0.1 KUA/L (ref ?–0.1)
P NOTATUM IGE QN: <0.1 KUA/L (ref ?–0.1)
PEANUT IGE QN: <0.1 KUA/L (ref ?–0.1)
PECAN/HICK NUT IGE QN: <0.1 KUA/L (ref ?–0.1)
PECAN/HICK TREE IGE QN: <0.1 KUA/L (ref ?–0.1)
PORK IGE QN: <0.1 KUA/L (ref ?–0.1)
POTATO IGE QN: <0.1 KUA/L (ref ?–0.1)
ROACH IGE QN: <0.1 KUA/L (ref ?–0.1)
RYE IGE QN: <0.1 KUA/L (ref ?–0.1)
SALTWORT IGE QN: <0.1 KUA/L (ref ?–0.1)
SHRIMP IGE QN: <0.1 KUA/L (ref ?–0.1)
SILVER BIRCH IGE QN: <0.1 KUA/L (ref ?–0.1)
SOYBEAN IGE QN: <0.1 KUA/L (ref ?–0.1)
TIMOTHY IGE QN: <0.1 KUA/L (ref ?–0.1)
TOMATO IGE QN: <0.1 KUA/L (ref ?–0.1)
WHEAT IGE QN: <0.1 KUA/L (ref ?–0.1)
WHITE ASH IGE QN: <0.1 KUA/L (ref ?–0.1)
WHITE ELM IGE QN: <0.1 KUA/L (ref ?–0.1)
WHITE MULBERRY IGE QN: <0.1 KUA/L (ref ?–0.1)
WHITE OAK IGE QN: <0.1 KUA/L (ref ?–0.1)

## 2025-07-15 LAB — REVERSE T3: 11.3 NG/DL

## 2025-07-16 ENCOUNTER — RESULTS FOLLOW-UP (OUTPATIENT)
Dept: INTEGRATIVE MEDICINE | Facility: CLINIC | Age: 50
End: 2025-07-16

## 2025-07-16 DIAGNOSIS — E03.9 HYPOTHYROIDISM, UNSPECIFIED TYPE: Primary | ICD-10-CM

## 2025-07-28 ENCOUNTER — PATIENT MESSAGE (OUTPATIENT)
Dept: INTEGRATIVE MEDICINE | Facility: CLINIC | Age: 50
End: 2025-07-28